# Patient Record
Sex: MALE | Race: WHITE | NOT HISPANIC OR LATINO
[De-identification: names, ages, dates, MRNs, and addresses within clinical notes are randomized per-mention and may not be internally consistent; named-entity substitution may affect disease eponyms.]

---

## 2017-02-10 ENCOUNTER — TRANSCRIPTION ENCOUNTER (OUTPATIENT)
Age: 77
End: 2017-02-10

## 2017-02-14 ENCOUNTER — APPOINTMENT (OUTPATIENT)
Dept: FAMILY MEDICINE | Facility: CLINIC | Age: 77
End: 2017-02-14

## 2017-02-14 VITALS
BODY MASS INDEX: 25.73 KG/M2 | WEIGHT: 190 LBS | DIASTOLIC BLOOD PRESSURE: 90 MMHG | HEIGHT: 72 IN | SYSTOLIC BLOOD PRESSURE: 120 MMHG

## 2017-02-14 DIAGNOSIS — Z87.09 PERSONAL HISTORY OF OTHER DISEASES OF THE RESPIRATORY SYSTEM: ICD-10-CM

## 2017-03-07 ENCOUNTER — APPOINTMENT (OUTPATIENT)
Dept: FAMILY MEDICINE | Facility: CLINIC | Age: 77
End: 2017-03-07

## 2017-03-07 VITALS
DIASTOLIC BLOOD PRESSURE: 80 MMHG | SYSTOLIC BLOOD PRESSURE: 120 MMHG | HEIGHT: 72 IN | WEIGHT: 195 LBS | BODY MASS INDEX: 26.41 KG/M2

## 2017-03-07 RX ORDER — AMOXICILLIN AND CLAVULANATE POTASSIUM 875; 125 MG/1; MG/1
875-125 TABLET, COATED ORAL
Qty: 14 | Refills: 0 | Status: COMPLETED | COMMUNITY
Start: 2017-02-14 | End: 2017-03-07

## 2017-03-08 LAB
ALBUMIN SERPL ELPH-MCNC: 4.3 G/DL
ALP BLD-CCNC: 54 U/L
ALT SERPL-CCNC: 14 U/L
ANION GAP SERPL CALC-SCNC: 12 MMOL/L
APPEARANCE: CLEAR
AST SERPL-CCNC: 20 U/L
BACTERIA: NEGATIVE
BASOPHILS # BLD AUTO: 0.03 K/UL
BASOPHILS NFR BLD AUTO: 0.4 %
BILIRUB SERPL-MCNC: 0.6 MG/DL
BILIRUBIN URINE: NEGATIVE
BLOOD URINE: NEGATIVE
BUN SERPL-MCNC: 22 MG/DL
CALCIUM SERPL-MCNC: 9.8 MG/DL
CHLORIDE SERPL-SCNC: 100 MMOL/L
CHOLEST SERPL-MCNC: 175 MG/DL
CHOLEST/HDLC SERPL: 2.7 RATIO
CO2 SERPL-SCNC: 27 MMOL/L
COLOR: YELLOW
CREAT SERPL-MCNC: 0.98 MG/DL
EOSINOPHIL # BLD AUTO: 0.34 K/UL
EOSINOPHIL NFR BLD AUTO: 4.7 %
GLUCOSE QUALITATIVE U: NORMAL MG/DL
GLUCOSE SERPL-MCNC: 97 MG/DL
HCT VFR BLD CALC: 42.9 %
HDLC SERPL-MCNC: 65 MG/DL
HGB BLD-MCNC: 14.5 G/DL
HYALINE CASTS: 0 /LPF
IMM GRANULOCYTES NFR BLD AUTO: 0.1 %
KETONES URINE: NEGATIVE
LDLC SERPL CALC-MCNC: 101 MG/DL
LEUKOCYTE ESTERASE URINE: NEGATIVE
LYMPHOCYTES # BLD AUTO: 1.68 K/UL
LYMPHOCYTES NFR BLD AUTO: 23.3 %
MAN DIFF?: NORMAL
MCHC RBC-ENTMCNC: 32.7 PG
MCHC RBC-ENTMCNC: 33.8 GM/DL
MCV RBC AUTO: 96.8 FL
MICROSCOPIC-UA: NORMAL
MONOCYTES # BLD AUTO: 0.75 K/UL
MONOCYTES NFR BLD AUTO: 10.4 %
NEUTROPHILS # BLD AUTO: 4.4 K/UL
NEUTROPHILS NFR BLD AUTO: 61.1 %
NITRITE URINE: NEGATIVE
PH URINE: 6.5
PLATELET # BLD AUTO: 206 K/UL
POTASSIUM SERPL-SCNC: 4.9 MMOL/L
PROT SERPL-MCNC: 7.2 G/DL
PROTEIN URINE: NEGATIVE MG/DL
PSA SERPL-MCNC: 5.32 NG/ML
RBC # BLD: 4.43 M/UL
RBC # FLD: 13.8 %
RED BLOOD CELLS URINE: 5 /HPF
SODIUM SERPL-SCNC: 139 MMOL/L
SPECIFIC GRAVITY URINE: 1.02
SQUAMOUS EPITHELIAL CELLS: 0 /HPF
TRIGL SERPL-MCNC: 45 MG/DL
TSH SERPL-ACNC: 4 UIU/ML
UROBILINOGEN URINE: NORMAL MG/DL
WBC # FLD AUTO: 7.21 K/UL
WHITE BLOOD CELLS URINE: 1 /HPF

## 2017-04-26 ENCOUNTER — TRANSCRIPTION ENCOUNTER (OUTPATIENT)
Age: 77
End: 2017-04-26

## 2017-11-03 ENCOUNTER — APPOINTMENT (OUTPATIENT)
Dept: FAMILY MEDICINE | Facility: CLINIC | Age: 77
End: 2017-11-03
Payer: MEDICARE

## 2017-11-03 VITALS
HEIGHT: 72 IN | DIASTOLIC BLOOD PRESSURE: 78 MMHG | HEART RATE: 60 BPM | SYSTOLIC BLOOD PRESSURE: 124 MMHG | WEIGHT: 195 LBS | BODY MASS INDEX: 26.41 KG/M2 | RESPIRATION RATE: 16 BRPM

## 2017-11-03 PROCEDURE — 99213 OFFICE O/P EST LOW 20 MIN: CPT | Mod: 25

## 2017-11-03 PROCEDURE — G0008: CPT

## 2017-11-03 PROCEDURE — 90688 IIV4 VACCINE SPLT 0.5 ML IM: CPT

## 2017-11-15 ENCOUNTER — RX RENEWAL (OUTPATIENT)
Age: 77
End: 2017-11-15

## 2017-11-20 ENCOUNTER — APPOINTMENT (OUTPATIENT)
Dept: FAMILY MEDICINE | Facility: CLINIC | Age: 77
End: 2017-11-20
Payer: MEDICARE

## 2017-11-20 PROCEDURE — 99214 OFFICE O/P EST MOD 30 MIN: CPT | Mod: 25

## 2017-11-20 PROCEDURE — 90670 PCV13 VACCINE IM: CPT

## 2017-11-20 PROCEDURE — G0009: CPT

## 2018-01-08 ENCOUNTER — APPOINTMENT (OUTPATIENT)
Dept: FAMILY MEDICINE | Facility: CLINIC | Age: 78
End: 2018-01-08
Payer: MEDICARE

## 2018-01-08 ENCOUNTER — LABORATORY RESULT (OUTPATIENT)
Age: 78
End: 2018-01-08

## 2018-01-08 VITALS
HEIGHT: 72 IN | SYSTOLIC BLOOD PRESSURE: 130 MMHG | DIASTOLIC BLOOD PRESSURE: 74 MMHG | BODY MASS INDEX: 22.35 KG/M2 | WEIGHT: 165 LBS

## 2018-01-08 PROCEDURE — 36415 COLL VENOUS BLD VENIPUNCTURE: CPT

## 2018-01-08 PROCEDURE — 99214 OFFICE O/P EST MOD 30 MIN: CPT | Mod: 25

## 2018-01-09 LAB
ALBUMIN SERPL ELPH-MCNC: 4.4 G/DL
ALP BLD-CCNC: 48 U/L
ALT SERPL-CCNC: 16 U/L
ANION GAP SERPL CALC-SCNC: 12 MMOL/L
AST SERPL-CCNC: 23 U/L
BASOPHILS # BLD AUTO: 0.06 K/UL
BASOPHILS NFR BLD AUTO: 0.9 %
BILIRUB SERPL-MCNC: 0.7 MG/DL
BUN SERPL-MCNC: 24 MG/DL
CALCIUM SERPL-MCNC: 9.7 MG/DL
CHLORIDE SERPL-SCNC: 101 MMOL/L
CHOLEST SERPL-MCNC: 179 MG/DL
CHOLEST/HDLC SERPL: 2.8 RATIO
CO2 SERPL-SCNC: 30 MMOL/L
CREAT SERPL-MCNC: 0.93 MG/DL
EOSINOPHIL # BLD AUTO: 0.38 K/UL
EOSINOPHIL NFR BLD AUTO: 6.1
GLUCOSE SERPL-MCNC: 93 MG/DL
HCT VFR BLD CALC: 45.1 %
HDLC SERPL-MCNC: 64 MG/DL
HGB BLD-MCNC: 13.6 G/DL
LDLC SERPL CALC-MCNC: 102 MG/DL
LYMPHOCYTES # BLD AUTO: 1.55 K/UL
LYMPHOCYTES NFR BLD AUTO: 24.6 %
MAN DIFF?: NORMAL
MCHC RBC-ENTMCNC: 30.2 GM/DL
MCHC RBC-ENTMCNC: 32.2 PG
MCV RBC AUTO: 106.6 FL
MONOCYTES # BLD AUTO: 0.66 K/UL
MONOCYTES NFR BLD AUTO: 10.5 %
NEUTROPHILS # BLD AUTO: 3.6 K/UL
NEUTROPHILS NFR BLD AUTO: 57 %
PLATELET # BLD AUTO: 183 K/UL
POTASSIUM SERPL-SCNC: 4.6 MMOL/L
PROT SERPL-MCNC: 7.3 G/DL
RBC # BLD: 4.23 M/UL
RBC # FLD: 14.5 %
SODIUM SERPL-SCNC: 143 MMOL/L
TRIGL SERPL-MCNC: 63 MG/DL
TSH SERPL-ACNC: 3.04 UIU/ML
WBC # FLD AUTO: 6.31 K/UL

## 2018-03-12 ENCOUNTER — APPOINTMENT (OUTPATIENT)
Dept: FAMILY MEDICINE | Facility: CLINIC | Age: 78
End: 2018-03-12
Payer: MEDICARE

## 2018-03-12 VITALS
RESPIRATION RATE: 16 BRPM | HEIGHT: 72 IN | TEMPERATURE: 97.6 F | OXYGEN SATURATION: 99 % | DIASTOLIC BLOOD PRESSURE: 72 MMHG | WEIGHT: 195 LBS | HEART RATE: 56 BPM | BODY MASS INDEX: 26.41 KG/M2 | SYSTOLIC BLOOD PRESSURE: 100 MMHG

## 2018-03-12 PROCEDURE — 36415 COLL VENOUS BLD VENIPUNCTURE: CPT

## 2018-03-12 PROCEDURE — G0438: CPT

## 2018-03-13 LAB
APPEARANCE: CLEAR
BACTERIA: NEGATIVE
BASOPHILS # BLD AUTO: 0.03 K/UL
BASOPHILS NFR BLD AUTO: 0.5 %
BILIRUBIN URINE: NEGATIVE
BLOOD URINE: NEGATIVE
COLOR: YELLOW
EOSINOPHIL # BLD AUTO: 0.37 K/UL
EOSINOPHIL NFR BLD AUTO: 5.6 %
GLUCOSE QUALITATIVE U: NEGATIVE MG/DL
HCT VFR BLD CALC: 44.8 %
HGB BLD-MCNC: 14.2 G/DL
IMM GRANULOCYTES NFR BLD AUTO: 0.2 %
KETONES URINE: NEGATIVE
LEUKOCYTE ESTERASE URINE: NEGATIVE
LYMPHOCYTES # BLD AUTO: 2.03 K/UL
LYMPHOCYTES NFR BLD AUTO: 30.5 %
MAN DIFF?: NORMAL
MCHC RBC-ENTMCNC: 31.7 GM/DL
MCHC RBC-ENTMCNC: 32.1 PG
MCV RBC AUTO: 101.1 FL
MICROSCOPIC-UA: NORMAL
MONOCYTES # BLD AUTO: 0.48 K/UL
MONOCYTES NFR BLD AUTO: 7.2 %
NEUTROPHILS # BLD AUTO: 3.73 K/UL
NEUTROPHILS NFR BLD AUTO: 56 %
NITRITE URINE: NEGATIVE
PH URINE: 7
PLATELET # BLD AUTO: 168 K/UL
PROTEIN URINE: NEGATIVE MG/DL
PSA SERPL-MCNC: 2.22 NG/ML
RBC # BLD: 4.43 M/UL
RBC # FLD: 14 %
RED BLOOD CELLS URINE: 1 /HPF
SPECIFIC GRAVITY URINE: 1.02
SQUAMOUS EPITHELIAL CELLS: 0 /HPF
UROBILINOGEN URINE: NEGATIVE MG/DL
WBC # FLD AUTO: 6.65 K/UL
WHITE BLOOD CELLS URINE: 0 /HPF

## 2018-06-12 ENCOUNTER — TRANSCRIPTION ENCOUNTER (OUTPATIENT)
Age: 78
End: 2018-06-12

## 2018-09-13 ENCOUNTER — RX RENEWAL (OUTPATIENT)
Age: 78
End: 2018-09-13

## 2018-09-18 ENCOUNTER — MEDICATION RENEWAL (OUTPATIENT)
Age: 78
End: 2018-09-18

## 2018-09-25 ENCOUNTER — MEDICATION RENEWAL (OUTPATIENT)
Age: 78
End: 2018-09-25

## 2018-09-25 ENCOUNTER — APPOINTMENT (OUTPATIENT)
Dept: FAMILY MEDICINE | Facility: CLINIC | Age: 78
End: 2018-09-25
Payer: MEDICARE

## 2018-09-25 VITALS
HEIGHT: 72 IN | WEIGHT: 190 LBS | OXYGEN SATURATION: 97 % | RESPIRATION RATE: 18 BRPM | HEART RATE: 56 BPM | BODY MASS INDEX: 25.73 KG/M2 | SYSTOLIC BLOOD PRESSURE: 140 MMHG | DIASTOLIC BLOOD PRESSURE: 84 MMHG

## 2018-09-25 PROCEDURE — 99214 OFFICE O/P EST MOD 30 MIN: CPT | Mod: 25

## 2018-09-25 PROCEDURE — 36415 COLL VENOUS BLD VENIPUNCTURE: CPT

## 2018-09-25 NOTE — HISTORY OF PRESENT ILLNESS
[FreeTextEntry8] : 78 year old male is here for a followup visit. Patient is here for medication renewals and for blood work discussion. Medications and allergies were reviewed and assessed.  There has been no new medications since the last visit. Patient is feeling well with no active changes or issues since His last visit.\par

## 2018-09-26 LAB
ALBUMIN SERPL ELPH-MCNC: 4.8 G/DL
ALP BLD-CCNC: 46 U/L
ALT SERPL-CCNC: 15 U/L
ANION GAP SERPL CALC-SCNC: 12 MMOL/L
AST SERPL-CCNC: 27 U/L
BASOPHILS # BLD AUTO: 0.03 K/UL
BASOPHILS NFR BLD AUTO: 0.5 %
BILIRUB SERPL-MCNC: 0.8 MG/DL
BUN SERPL-MCNC: 16 MG/DL
CALCIUM SERPL-MCNC: 9.7 MG/DL
CHLORIDE SERPL-SCNC: 99 MMOL/L
CHOLEST SERPL-MCNC: 169 MG/DL
CHOLEST/HDLC SERPL: 2.3 RATIO
CO2 SERPL-SCNC: 30 MMOL/L
CREAT SERPL-MCNC: 0.98 MG/DL
EOSINOPHIL # BLD AUTO: 0.5 K/UL
EOSINOPHIL NFR BLD AUTO: 8.3 %
GLUCOSE SERPL-MCNC: 95 MG/DL
HBA1C MFR BLD HPLC: 5.1 %
HCT VFR BLD CALC: 43.1 %
HDLC SERPL-MCNC: 72 MG/DL
HGB BLD-MCNC: 14.2 G/DL
IMM GRANULOCYTES NFR BLD AUTO: 0.2 %
LDLC SERPL CALC-MCNC: 86 MG/DL
LYMPHOCYTES # BLD AUTO: 1.76 K/UL
LYMPHOCYTES NFR BLD AUTO: 29.1 %
MAN DIFF?: NORMAL
MCHC RBC-ENTMCNC: 32.9 GM/DL
MCHC RBC-ENTMCNC: 33 PG
MCV RBC AUTO: 100.2 FL
MONOCYTES # BLD AUTO: 0.57 K/UL
MONOCYTES NFR BLD AUTO: 9.4 %
NEUTROPHILS # BLD AUTO: 3.18 K/UL
NEUTROPHILS NFR BLD AUTO: 52.5 %
PLATELET # BLD AUTO: 182 K/UL
POTASSIUM SERPL-SCNC: 4.6 MMOL/L
PROT SERPL-MCNC: 7.1 G/DL
RBC # BLD: 4.3 M/UL
RBC # FLD: 13.6 %
SODIUM SERPL-SCNC: 141 MMOL/L
TRIGL SERPL-MCNC: 57 MG/DL
WBC # FLD AUTO: 6.05 K/UL

## 2018-10-05 ENCOUNTER — APPOINTMENT (OUTPATIENT)
Dept: FAMILY MEDICINE | Facility: CLINIC | Age: 78
End: 2018-10-05
Payer: MEDICARE

## 2018-10-05 VITALS
DIASTOLIC BLOOD PRESSURE: 70 MMHG | SYSTOLIC BLOOD PRESSURE: 120 MMHG | BODY MASS INDEX: 25.73 KG/M2 | HEIGHT: 72 IN | WEIGHT: 190 LBS

## 2018-10-05 DIAGNOSIS — Z23 ENCOUNTER FOR IMMUNIZATION: ICD-10-CM

## 2018-10-05 PROCEDURE — G0008: CPT

## 2018-10-05 PROCEDURE — 99213 OFFICE O/P EST LOW 20 MIN: CPT | Mod: 25

## 2018-10-05 PROCEDURE — 90686 IIV4 VACC NO PRSV 0.5 ML IM: CPT

## 2018-10-05 NOTE — PHYSICAL EXAM
[Well Nourished] : well nourished [Clear to Auscultation] : lungs were clear to auscultation bilaterally [Regular Rhythm] : with a regular rhythm [Normal S1, S2] : normal S1 and S2 [Normal Gait] : normal gait

## 2018-10-05 NOTE — ASSESSMENT
[FreeTextEntry1] : thyroid - stable\par \par Patient was given a vaccine and was counseled regarding all side affects. Patient was advised to ice the area if necessary if it is tender or red. Patient was told to return to office if has any fever, nausea, vomiting or increased pain.\par \par

## 2018-10-05 NOTE — HISTORY OF PRESENT ILLNESS
[FreeTextEntry8] : 78 year old male here for flu shot. patient with history of hypothyroid. Patients active medications, allergies and issues were all reviewed with the patient at time of visit.\par  [de-identified] : acid reflux, shot

## 2018-10-08 ENCOUNTER — APPOINTMENT (OUTPATIENT)
Dept: ORTHOPEDIC SURGERY | Facility: CLINIC | Age: 78
End: 2018-10-08

## 2019-01-15 ENCOUNTER — APPOINTMENT (OUTPATIENT)
Dept: FAMILY MEDICINE | Facility: CLINIC | Age: 79
End: 2019-01-15
Payer: MEDICARE

## 2019-01-15 VITALS
DIASTOLIC BLOOD PRESSURE: 70 MMHG | OXYGEN SATURATION: 94 % | HEIGHT: 72 IN | HEART RATE: 66 BPM | RESPIRATION RATE: 16 BRPM | BODY MASS INDEX: 27.5 KG/M2 | WEIGHT: 203 LBS | SYSTOLIC BLOOD PRESSURE: 120 MMHG

## 2019-01-15 PROCEDURE — 99213 OFFICE O/P EST LOW 20 MIN: CPT

## 2019-01-16 ENCOUNTER — MEDICATION RENEWAL (OUTPATIENT)
Age: 79
End: 2019-01-16

## 2019-03-05 ENCOUNTER — APPOINTMENT (OUTPATIENT)
Dept: FAMILY MEDICINE | Facility: CLINIC | Age: 79
End: 2019-03-05
Payer: MEDICARE

## 2019-03-05 VITALS
WEIGHT: 203 LBS | SYSTOLIC BLOOD PRESSURE: 118 MMHG | HEIGHT: 72 IN | DIASTOLIC BLOOD PRESSURE: 80 MMHG | BODY MASS INDEX: 27.5 KG/M2

## 2019-03-05 PROCEDURE — 99213 OFFICE O/P EST LOW 20 MIN: CPT

## 2019-03-05 RX ORDER — CYCLOBENZAPRINE HYDROCHLORIDE 5 MG/1
5 TABLET, FILM COATED ORAL
Qty: 14 | Refills: 0 | Status: DISCONTINUED | COMMUNITY
Start: 2019-01-15 | End: 2019-03-05

## 2019-03-14 ENCOUNTER — APPOINTMENT (OUTPATIENT)
Dept: FAMILY MEDICINE | Facility: CLINIC | Age: 79
End: 2019-03-14
Payer: MEDICARE

## 2019-03-14 VITALS
SYSTOLIC BLOOD PRESSURE: 120 MMHG | BODY MASS INDEX: 27.09 KG/M2 | DIASTOLIC BLOOD PRESSURE: 80 MMHG | RESPIRATION RATE: 16 BRPM | WEIGHT: 200 LBS | OXYGEN SATURATION: 92 % | HEIGHT: 72 IN | HEART RATE: 66 BPM

## 2019-03-14 PROCEDURE — 36415 COLL VENOUS BLD VENIPUNCTURE: CPT

## 2019-03-14 PROCEDURE — 93000 ELECTROCARDIOGRAM COMPLETE: CPT

## 2019-03-14 PROCEDURE — G0438: CPT

## 2019-03-15 LAB
ALBUMIN SERPL ELPH-MCNC: 4.3 G/DL
ALP BLD-CCNC: 43 U/L
ALT SERPL-CCNC: 17 U/L
ANION GAP SERPL CALC-SCNC: 12 MMOL/L
APPEARANCE: CLEAR
AST SERPL-CCNC: 21 U/L
BACTERIA: NEGATIVE
BASOPHILS # BLD AUTO: 0.04 K/UL
BASOPHILS NFR BLD AUTO: 0.6 %
BILIRUB SERPL-MCNC: 0.6 MG/DL
BILIRUBIN URINE: NEGATIVE
BLOOD URINE: NEGATIVE
BUN SERPL-MCNC: 21 MG/DL
CALCIUM SERPL-MCNC: 9.7 MG/DL
CHLORIDE SERPL-SCNC: 102 MMOL/L
CHOLEST SERPL-MCNC: 161 MG/DL
CHOLEST/HDLC SERPL: 2.7 RATIO
CO2 SERPL-SCNC: 27 MMOL/L
COLOR: YELLOW
CREAT SERPL-MCNC: 1.02 MG/DL
EOSINOPHIL # BLD AUTO: 0.55 K/UL
EOSINOPHIL NFR BLD AUTO: 8.7 %
GLUCOSE QUALITATIVE U: NEGATIVE
GLUCOSE SERPL-MCNC: 99 MG/DL
HBA1C MFR BLD HPLC: 5.2 %
HCT VFR BLD CALC: 44.1 %
HDLC SERPL-MCNC: 59 MG/DL
HGB BLD-MCNC: 13.9 G/DL
HYALINE CASTS: 0 /LPF
IMM GRANULOCYTES NFR BLD AUTO: 0.2 %
KETONES URINE: NEGATIVE
LDLC SERPL CALC-MCNC: 88 MG/DL
LEUKOCYTE ESTERASE URINE: NEGATIVE
LYMPHOCYTES # BLD AUTO: 1.72 K/UL
LYMPHOCYTES NFR BLD AUTO: 27.1 %
MAN DIFF?: NORMAL
MCHC RBC-ENTMCNC: 31.5 GM/DL
MCHC RBC-ENTMCNC: 32.3 PG
MCV RBC AUTO: 102.3 FL
MICROSCOPIC-UA: NORMAL
MONOCYTES # BLD AUTO: 0.45 K/UL
MONOCYTES NFR BLD AUTO: 7.1 %
NEUTROPHILS # BLD AUTO: 3.58 K/UL
NEUTROPHILS NFR BLD AUTO: 56.3 %
NITRITE URINE: NEGATIVE
PH URINE: 7
PLATELET # BLD AUTO: 165 K/UL
POTASSIUM SERPL-SCNC: 4.4 MMOL/L
PROT SERPL-MCNC: 6.7 G/DL
PROTEIN URINE: NEGATIVE
PSA SERPL-MCNC: 2.48 NG/ML
RBC # BLD: 4.31 M/UL
RBC # FLD: 13 %
RED BLOOD CELLS URINE: 1 /HPF
SODIUM SERPL-SCNC: 141 MMOL/L
SPECIFIC GRAVITY URINE: 1.02
SQUAMOUS EPITHELIAL CELLS: 0 /HPF
TRIGL SERPL-MCNC: 70 MG/DL
TSH SERPL-ACNC: 3.86 UIU/ML
UROBILINOGEN URINE: NORMAL
WBC # FLD AUTO: 6.35 K/UL
WHITE BLOOD CELLS URINE: 0 /HPF

## 2019-03-16 LAB — 25(OH)D3 SERPL-MCNC: 46.9 NG/ML

## 2019-03-21 ENCOUNTER — TRANSCRIPTION ENCOUNTER (OUTPATIENT)
Age: 79
End: 2019-03-21

## 2019-04-02 ENCOUNTER — MEDICATION RENEWAL (OUTPATIENT)
Age: 79
End: 2019-04-02

## 2019-06-06 ENCOUNTER — RX RENEWAL (OUTPATIENT)
Age: 79
End: 2019-06-06

## 2019-07-21 ENCOUNTER — APPOINTMENT (OUTPATIENT)
Dept: FAMILY MEDICINE | Facility: CLINIC | Age: 79
End: 2019-07-21
Payer: COMMERCIAL

## 2019-07-21 VITALS
SYSTOLIC BLOOD PRESSURE: 140 MMHG | HEIGHT: 72 IN | OXYGEN SATURATION: 94 % | WEIGHT: 200 LBS | BODY MASS INDEX: 27.09 KG/M2 | RESPIRATION RATE: 16 BRPM | DIASTOLIC BLOOD PRESSURE: 80 MMHG | HEART RATE: 67 BPM

## 2019-07-21 PROCEDURE — 99213 OFFICE O/P EST LOW 20 MIN: CPT

## 2019-07-21 RX ORDER — KETOCONAZOLE 20 MG/G
2 CREAM TOPICAL
Qty: 60 | Refills: 0 | Status: DISCONTINUED | COMMUNITY
Start: 2019-04-25

## 2019-09-25 ENCOUNTER — RX RENEWAL (OUTPATIENT)
Age: 79
End: 2019-09-25

## 2019-09-26 ENCOUNTER — RX RENEWAL (OUTPATIENT)
Age: 79
End: 2019-09-26

## 2019-10-04 ENCOUNTER — TRANSCRIPTION ENCOUNTER (OUTPATIENT)
Age: 79
End: 2019-10-04

## 2019-10-20 ENCOUNTER — RX RENEWAL (OUTPATIENT)
Age: 79
End: 2019-10-20

## 2019-11-05 ENCOUNTER — APPOINTMENT (OUTPATIENT)
Dept: FAMILY MEDICINE | Facility: CLINIC | Age: 79
End: 2019-11-05
Payer: MEDICARE

## 2019-11-05 VITALS
BODY MASS INDEX: 27.09 KG/M2 | DIASTOLIC BLOOD PRESSURE: 80 MMHG | WEIGHT: 200 LBS | SYSTOLIC BLOOD PRESSURE: 110 MMHG | HEIGHT: 72 IN

## 2019-11-05 PROCEDURE — G0008: CPT

## 2019-11-05 PROCEDURE — 90686 IIV4 VACC NO PRSV 0.5 ML IM: CPT

## 2019-11-05 PROCEDURE — 36415 COLL VENOUS BLD VENIPUNCTURE: CPT

## 2019-11-05 PROCEDURE — 99214 OFFICE O/P EST MOD 30 MIN: CPT | Mod: 25

## 2019-11-06 LAB
ALBUMIN SERPL ELPH-MCNC: 4.7 G/DL
ALP BLD-CCNC: 48 U/L
ALT SERPL-CCNC: 15 U/L
ANION GAP SERPL CALC-SCNC: 14 MMOL/L
AST SERPL-CCNC: 24 U/L
BASOPHILS # BLD AUTO: 0.05 K/UL
BASOPHILS NFR BLD AUTO: 0.6 %
BILIRUB SERPL-MCNC: 0.5 MG/DL
BUN SERPL-MCNC: 20 MG/DL
CALCIUM SERPL-MCNC: 10 MG/DL
CHLORIDE SERPL-SCNC: 95 MMOL/L
CHOLEST SERPL-MCNC: 178 MG/DL
CHOLEST/HDLC SERPL: 2.7 RATIO
CO2 SERPL-SCNC: 27 MMOL/L
CREAT SERPL-MCNC: 1.03 MG/DL
EOSINOPHIL # BLD AUTO: 0.55 K/UL
EOSINOPHIL NFR BLD AUTO: 7.1 %
GLUCOSE SERPL-MCNC: 88 MG/DL
HCT VFR BLD CALC: 43.1 %
HDLC SERPL-MCNC: 67 MG/DL
HGB BLD-MCNC: 14.5 G/DL
IMM GRANULOCYTES NFR BLD AUTO: 0.3 %
LDLC SERPL CALC-MCNC: 89 MG/DL
LYMPHOCYTES # BLD AUTO: 2.5 K/UL
LYMPHOCYTES NFR BLD AUTO: 32.4 %
MAN DIFF?: NORMAL
MCHC RBC-ENTMCNC: 33.5 PG
MCHC RBC-ENTMCNC: 33.6 GM/DL
MCV RBC AUTO: 99.5 FL
MONOCYTES # BLD AUTO: 0.66 K/UL
MONOCYTES NFR BLD AUTO: 8.5 %
NEUTROPHILS # BLD AUTO: 3.94 K/UL
NEUTROPHILS NFR BLD AUTO: 51.1 %
PLATELET # BLD AUTO: 185 K/UL
POTASSIUM SERPL-SCNC: 3.6 MMOL/L
PROT SERPL-MCNC: 7.3 G/DL
RBC # BLD: 4.33 M/UL
RBC # FLD: 12.9 %
SODIUM SERPL-SCNC: 136 MMOL/L
TRIGL SERPL-MCNC: 108 MG/DL
TSH SERPL-ACNC: 6.55 UIU/ML
WBC # FLD AUTO: 7.72 K/UL

## 2020-04-27 ENCOUNTER — RX RENEWAL (OUTPATIENT)
Age: 80
End: 2020-04-27

## 2020-05-13 ENCOUNTER — RX RENEWAL (OUTPATIENT)
Age: 80
End: 2020-05-13

## 2020-06-07 ENCOUNTER — RX RENEWAL (OUTPATIENT)
Age: 80
End: 2020-06-07

## 2020-06-11 ENCOUNTER — APPOINTMENT (OUTPATIENT)
Dept: FAMILY MEDICINE | Facility: CLINIC | Age: 80
End: 2020-06-11
Payer: MEDICARE

## 2020-06-11 VITALS
HEIGHT: 72 IN | BODY MASS INDEX: 26.82 KG/M2 | WEIGHT: 198 LBS | HEART RATE: 87 BPM | TEMPERATURE: 98.2 F | DIASTOLIC BLOOD PRESSURE: 80 MMHG | SYSTOLIC BLOOD PRESSURE: 130 MMHG | OXYGEN SATURATION: 99 %

## 2020-06-11 DIAGNOSIS — K21.9 GASTRO-ESOPHAGEAL REFLUX DISEASE W/OUT ESOPHAGITIS: ICD-10-CM

## 2020-06-11 PROCEDURE — G0439: CPT | Mod: 25

## 2020-06-11 PROCEDURE — 36415 COLL VENOUS BLD VENIPUNCTURE: CPT

## 2020-06-11 RX ORDER — FUROSEMIDE 20 MG/1
20 TABLET ORAL DAILY
Qty: 30 | Refills: 0 | Status: DISCONTINUED | COMMUNITY
Start: 2019-07-21 | End: 2020-06-11

## 2020-10-26 ENCOUNTER — APPOINTMENT (OUTPATIENT)
Dept: FAMILY MEDICINE | Facility: CLINIC | Age: 80
End: 2020-10-26

## 2020-12-10 ENCOUNTER — APPOINTMENT (OUTPATIENT)
Dept: FAMILY MEDICINE | Facility: CLINIC | Age: 80
End: 2020-12-10
Payer: MEDICARE

## 2020-12-10 VITALS
HEART RATE: 87 BPM | WEIGHT: 207 LBS | SYSTOLIC BLOOD PRESSURE: 122 MMHG | OXYGEN SATURATION: 95 % | TEMPERATURE: 97.9 F | BODY MASS INDEX: 28.07 KG/M2 | DIASTOLIC BLOOD PRESSURE: 80 MMHG

## 2020-12-10 DIAGNOSIS — H04.129 DRY EYE SYNDROME OF UNSPECIFIED LACRIMAL GLAND: ICD-10-CM

## 2020-12-10 DIAGNOSIS — Z11.59 ENCOUNTER FOR SCREENING FOR OTHER VIRAL DISEASES: ICD-10-CM

## 2020-12-10 PROCEDURE — 99072 ADDL SUPL MATRL&STAF TM PHE: CPT

## 2020-12-10 PROCEDURE — 36415 COLL VENOUS BLD VENIPUNCTURE: CPT

## 2020-12-10 PROCEDURE — 99214 OFFICE O/P EST MOD 30 MIN: CPT | Mod: 25

## 2020-12-10 RX ORDER — OMEPRAZOLE 40 MG/1
40 CAPSULE, DELAYED RELEASE ORAL
Qty: 30 | Refills: 1 | Status: DISCONTINUED | COMMUNITY
Start: 2017-11-20 | End: 2020-12-10

## 2020-12-10 NOTE — REVIEW OF SYSTEMS
[Negative] : Genitourinary [Headache] : no headache [Dizziness] : no dizziness [FreeTextEntry3] : right eye dryness [FreeTextEntry8] : ED

## 2020-12-10 NOTE — PHYSICAL EXAM
[No Edema] : there was no peripheral edema [No Extremity Clubbing/Cyanosis] : no extremity clubbing/cyanosis [Normal] : affect was normal and insight and judgment were intact [Normal Oropharynx] : the oropharynx was normal [de-identified] : possible small start of stye right lower inner lid

## 2020-12-10 NOTE — PLAN
[FreeTextEntry1] : Will follow up labwork drawn in office today.\par \par Would like COVID ab testing. \par Discussed COVID antibody test.  Discussed precautions.  Advised antibody presence indicates prior exposure/infection.  Not used to diagnose current infection.\par \par Will get records from Dr. Daigle's office, evaluate possibility for ED medications. \par \par Dry eye-recommending trial of artificial tears; can apply warm compresses to eyelid 4x a day if stye develops. \par Patient expressed understanding of plan.

## 2020-12-15 PROBLEM — Z87.09 HISTORY OF ACUTE SINUSITIS: Status: RESOLVED | Noted: 2017-02-14 | Resolved: 2020-12-15

## 2020-12-18 ENCOUNTER — RX RENEWAL (OUTPATIENT)
Age: 80
End: 2020-12-18

## 2021-02-04 ENCOUNTER — APPOINTMENT (OUTPATIENT)
Dept: FAMILY MEDICINE | Facility: CLINIC | Age: 81
End: 2021-02-04
Payer: MEDICARE

## 2021-02-04 VITALS — SYSTOLIC BLOOD PRESSURE: 134 MMHG | DIASTOLIC BLOOD PRESSURE: 84 MMHG

## 2021-02-04 VITALS
TEMPERATURE: 96.7 F | SYSTOLIC BLOOD PRESSURE: 140 MMHG | WEIGHT: 208 LBS | DIASTOLIC BLOOD PRESSURE: 86 MMHG | HEART RATE: 69 BPM | OXYGEN SATURATION: 95 % | BODY MASS INDEX: 28.21 KG/M2

## 2021-02-04 PROCEDURE — 99213 OFFICE O/P EST LOW 20 MIN: CPT

## 2021-02-04 NOTE — HISTORY OF PRESENT ILLNESS
[FreeTextEntry1] : Here for follow-up, elevated BP reading.  [de-identified] : Here for follow-up, elevated BP reading. \par \par Ate a lot of salty foods last night. Had peperonni pizza; had cold cut sandwich. \par Did not take his BP medication yet this morning-but reports his BP is always in range.\par \par Otherwise has been feeling ok.\par Medications and allergies reviewed.\par

## 2021-02-04 NOTE — REVIEW OF SYSTEMS
[Negative] : Gastrointestinal [Fever] : no fever [Chills] : no chills [Headache] : no headache [Dizziness] : no dizziness

## 2021-02-04 NOTE — PLAN
[FreeTextEntry1] : Repeat BP improved. Continue to monitor.  Continue current medications.\par \par Discussed blood pressure monitoring and normal ranges for BP.  Keep BP log.  Advised elevated readings or symptoms require immediate evaluation. \par Advised patient if they have a home blood pressure cuff, they may bring it with them to their next visit and compare machine readings.  \par Avoid added salt in diet.\par \par Patient will return for annual wellness visit. \par Patient expressed understanding.\par

## 2021-02-17 ENCOUNTER — APPOINTMENT (OUTPATIENT)
Dept: FAMILY MEDICINE | Facility: CLINIC | Age: 81
End: 2021-02-17

## 2021-03-02 ENCOUNTER — APPOINTMENT (OUTPATIENT)
Dept: FAMILY MEDICINE | Facility: CLINIC | Age: 81
End: 2021-03-02
Payer: MEDICARE

## 2021-03-02 VITALS
HEART RATE: 66 BPM | DIASTOLIC BLOOD PRESSURE: 98 MMHG | SYSTOLIC BLOOD PRESSURE: 146 MMHG | WEIGHT: 206 LBS | HEIGHT: 72 IN | OXYGEN SATURATION: 97 % | TEMPERATURE: 97.4 F | BODY MASS INDEX: 27.9 KG/M2

## 2021-03-02 VITALS — DIASTOLIC BLOOD PRESSURE: 90 MMHG | SYSTOLIC BLOOD PRESSURE: 154 MMHG

## 2021-03-02 DIAGNOSIS — M25.512 PAIN IN LEFT SHOULDER: ICD-10-CM

## 2021-03-02 PROCEDURE — 36415 COLL VENOUS BLD VENIPUNCTURE: CPT

## 2021-03-02 PROCEDURE — 99214 OFFICE O/P EST MOD 30 MIN: CPT | Mod: 25

## 2021-03-02 NOTE — REVIEW OF SYSTEMS
[Negative] : Genitourinary [Joint Pain] : joint pain [Headache] : no headache [Dizziness] : no dizziness

## 2021-03-02 NOTE — PLAN
[FreeTextEntry1] : Still with elevated BP. \par Recheck Creatinine. \par \par Advised to switch regimen, Quinapril twice a day and HCTZ once a day. \par Advised to notify us if any changes, continue to monitor BP at home. \par Any questions about medications advised to call. \par \par Shoulder pain-most likely rotator cuff strain.  With limitation in movement, recommending Ortho eval, referral placed. \par Advised to try trial of heat/warm compresses for muscle tenderness as needed.  Advised to cover compress, not place directly on skin and not to apply for more than 15 minutes at a time.  Patient expressed understanding.\par \par Will adjust meds based on labs. \par Patient expressed understanding of plan.\par

## 2021-03-02 NOTE — HISTORY OF PRESENT ILLNESS
[FreeTextEntry1] : Here for BP check, left shoulder pain.  [de-identified] : Here for BP check, left shoulder pain. \par Left shoulder has been bothering him for 6 weeks.\par Picked something up and remembers it bothering him afterwards. \par \par Has not tried anything for the pain. \par Worst with lifting arm.  Feels restricted. \par 6/10 with movement. No pain at rest. \par \par No old injuries. Right handed. \par \par Patient started doubling Quinapril-HCTZ on his own at beginning of February.  Still gets some high readings; does not have cuff with him today.  Brings log.  Running 130s-140s. \par Medications and allergies reviewed.\par

## 2021-03-02 NOTE — PHYSICAL EXAM
[Normal Oropharynx] : the oropharynx was normal [No Edema] : there was no peripheral edema [No Extremity Clubbing/Cyanosis] : no extremity clubbing/cyanosis [Normal] : affect was normal and insight and judgment were intact [Grossly Normal Strength/Tone] : grossly normal strength/tone [de-identified] : Left shoulder restricted in abduction; restricted in Apley's scratch.  Tender to palpation over left biceps.

## 2021-03-03 LAB
ANION GAP SERPL CALC-SCNC: 9 MMOL/L
BUN SERPL-MCNC: 21 MG/DL
CALCIUM SERPL-MCNC: 9.7 MG/DL
CHLORIDE SERPL-SCNC: 99 MMOL/L
CO2 SERPL-SCNC: 29 MMOL/L
CREAT SERPL-MCNC: 1 MG/DL
GLUCOSE SERPL-MCNC: 86 MG/DL
POTASSIUM SERPL-SCNC: 3.7 MMOL/L
SODIUM SERPL-SCNC: 137 MMOL/L

## 2021-03-10 ENCOUNTER — APPOINTMENT (OUTPATIENT)
Dept: ORTHOPEDIC SURGERY | Facility: CLINIC | Age: 81
End: 2021-03-10
Payer: MEDICARE

## 2021-03-10 VITALS
BODY MASS INDEX: 27.5 KG/M2 | HEIGHT: 72 IN | WEIGHT: 203 LBS | SYSTOLIC BLOOD PRESSURE: 158 MMHG | DIASTOLIC BLOOD PRESSURE: 87 MMHG | OXYGEN SATURATION: 98 % | HEART RATE: 62 BPM

## 2021-03-10 DIAGNOSIS — M19.019 PRIMARY OSTEOARTHRITIS, UNSPECIFIED SHOULDER: ICD-10-CM

## 2021-03-10 PROCEDURE — 99204 OFFICE O/P NEW MOD 45 MIN: CPT

## 2021-03-10 PROCEDURE — 73030 X-RAY EXAM OF SHOULDER: CPT | Mod: LT

## 2021-03-10 NOTE — HISTORY OF PRESENT ILLNESS
[Pain Location] : pain [] : left shoulder [Worsening] : worsening [___ wks] : [unfilled] week(s) ago [4] : a maximum pain level of 4/10 [Daily] : ~He/She~ states the symptoms seem to be occuring daily [Bending] : worsened by bending [Direct Pressure] : worsened by direct pressure [Lifting] : worsened by lifting [Rest] : relieved by rest [de-identified] : SHELIA   is a 80 year right hand dominant M who presents with ;eft shoulder pain.  Pain is primarily located at the superior and anterior shoulder. It began 6-8 weeks ago after doing some overhead lifting of boxes. .  Pain is described as sharp/achy in nature, 4/10 in intensity, worse with overhead activity, better with rest.  \par Denies bowel/bladder changes, fevers, chills, saddle anesthesia.  Denies numbness, tingling, weakness of the u[[er extremities.    \par \par

## 2021-03-10 NOTE — PHYSICAL EXAM
[de-identified] : Constitutional: Well-nourished, well-developed, No acute distress\par Respiratory:  Good respiratory effort, no SOB\par Lymphatic: No regional lymphadenopathy, no lymphedema\par Psychiatric: Pleasant and normal affect, alert and oriented x3\par Skin: Clean dry and intact B/L UE/LE\par Musculoskeletal: normal except where as noted in regional exam\par \par \par Left Shoulder:\par APPEARANCE: no marked deformities, no swelling or malalignment\par POSITIVE TENDERNESS: AC joint, biceps tendon\par ROM: Pain with  Flexion and abduction 0-100,  no scapular winging or dyskinesia present\par RESISTIVE TESTING: painless 5/5 resisted flex/ext, empty can/ER/IR, horizontal abd/add \par SPECIAL TESTS: neg Drop Arm, neg Empty Can, neg Soares/Neers, neg Anguiano's, neg Speeds, neg Apprehension, p+ cross arm adduction, + apley's scratch test, + AC compression\par Vasc: 2+ radial pulse\par Neuro: AIN, PIN, Ulnar nerve intact to motor, DTRs 2+/4 biceps, triceps, brachioradialis\par Sensation: Intact to light touch throughout\par B/L Elbows:  No asymmetry, malalignment, or swelling, Full ROM, 5/5 strength in flexion/ext, pronation/supination, Joints stable\par B/L Wrist and Hand:  No asymmetry, malalignment, or swelling, Full ROM, 5/5 strength in wrist and long finger flexion/ext, radial/ulnar deviation, Joints stable\par \par \par

## 2021-06-03 ENCOUNTER — RX RENEWAL (OUTPATIENT)
Age: 81
End: 2021-06-03

## 2021-06-14 ENCOUNTER — APPOINTMENT (OUTPATIENT)
Dept: FAMILY MEDICINE | Facility: CLINIC | Age: 81
End: 2021-06-14
Payer: MEDICARE

## 2021-06-14 VITALS
HEIGHT: 72 IN | DIASTOLIC BLOOD PRESSURE: 82 MMHG | HEART RATE: 67 BPM | TEMPERATURE: 98 F | BODY MASS INDEX: 27.77 KG/M2 | SYSTOLIC BLOOD PRESSURE: 118 MMHG | OXYGEN SATURATION: 96 % | WEIGHT: 205 LBS

## 2021-06-14 LAB
25(OH)D3 SERPL-MCNC: 48.2 NG/ML
ALBUMIN SERPL ELPH-MCNC: 4.5 G/DL
ALP BLD-CCNC: 58 U/L
ALT SERPL-CCNC: 15 U/L
ANION GAP SERPL CALC-SCNC: 10 MMOL/L
AST SERPL-CCNC: 20 U/L
BASOPHILS # BLD AUTO: 0.05 K/UL
BASOPHILS NFR BLD AUTO: 0.7 %
BILIRUB SERPL-MCNC: 0.8 MG/DL
BUN SERPL-MCNC: 15 MG/DL
CALCIUM SERPL-MCNC: 10.2 MG/DL
CHLORIDE SERPL-SCNC: 100 MMOL/L
CHOLEST SERPL-MCNC: 165 MG/DL
CO2 SERPL-SCNC: 31 MMOL/L
CREAT SERPL-MCNC: 1.04 MG/DL
EOSINOPHIL # BLD AUTO: 0.36 K/UL
EOSINOPHIL NFR BLD AUTO: 5.3 %
ESTIMATED AVERAGE GLUCOSE: 100 MG/DL
GLUCOSE SERPL-MCNC: 90 MG/DL
HBA1C MFR BLD HPLC: 5.1 %
HCT VFR BLD CALC: 44.3 %
HDLC SERPL-MCNC: 63 MG/DL
HGB BLD-MCNC: 14.7 G/DL
IMM GRANULOCYTES NFR BLD AUTO: 0.1 %
LDLC SERPL CALC-MCNC: 91 MG/DL
LYMPHOCYTES # BLD AUTO: 1.59 K/UL
LYMPHOCYTES NFR BLD AUTO: 23.5 %
MAN DIFF?: NORMAL
MCHC RBC-ENTMCNC: 32.9 PG
MCHC RBC-ENTMCNC: 33.2 GM/DL
MCV RBC AUTO: 99.1 FL
MONOCYTES # BLD AUTO: 0.66 K/UL
MONOCYTES NFR BLD AUTO: 9.7 %
NEUTROPHILS # BLD AUTO: 4.11 K/UL
NEUTROPHILS NFR BLD AUTO: 60.7 %
NONHDLC SERPL-MCNC: 102 MG/DL
PLATELET # BLD AUTO: 180 K/UL
POTASSIUM SERPL-SCNC: 4.4 MMOL/L
PROT SERPL-MCNC: 6.7 G/DL
PSA SERPL-MCNC: 2.34 NG/ML
RBC # BLD: 4.47 M/UL
RBC # FLD: 13 %
SODIUM SERPL-SCNC: 140 MMOL/L
TRIGL SERPL-MCNC: 58 MG/DL
TSH SERPL-ACNC: 3.16 UIU/ML
WBC # FLD AUTO: 6.78 K/UL

## 2021-06-14 PROCEDURE — 36415 COLL VENOUS BLD VENIPUNCTURE: CPT

## 2021-06-14 PROCEDURE — G0439: CPT

## 2021-06-28 ENCOUNTER — RX RENEWAL (OUTPATIENT)
Age: 81
End: 2021-06-28

## 2021-07-08 ENCOUNTER — RX RENEWAL (OUTPATIENT)
Age: 81
End: 2021-07-08

## 2021-10-03 ENCOUNTER — RX RENEWAL (OUTPATIENT)
Age: 81
End: 2021-10-03

## 2021-10-10 ENCOUNTER — RX RENEWAL (OUTPATIENT)
Age: 81
End: 2021-10-10

## 2021-12-13 ENCOUNTER — RX RENEWAL (OUTPATIENT)
Age: 81
End: 2021-12-13

## 2021-12-14 ENCOUNTER — APPOINTMENT (OUTPATIENT)
Dept: FAMILY MEDICINE | Facility: CLINIC | Age: 81
End: 2021-12-14
Payer: MEDICARE

## 2021-12-14 VITALS
HEIGHT: 72 IN | BODY MASS INDEX: 25.6 KG/M2 | DIASTOLIC BLOOD PRESSURE: 90 MMHG | HEART RATE: 70 BPM | WEIGHT: 189 LBS | SYSTOLIC BLOOD PRESSURE: 140 MMHG | TEMPERATURE: 98.2 F | OXYGEN SATURATION: 98 %

## 2021-12-14 DIAGNOSIS — M79.2 NEURALGIA AND NEURITIS, UNSPECIFIED: ICD-10-CM

## 2021-12-14 PROCEDURE — 99214 OFFICE O/P EST MOD 30 MIN: CPT | Mod: 25

## 2021-12-14 PROCEDURE — 36415 COLL VENOUS BLD VENIPUNCTURE: CPT

## 2021-12-14 RX ORDER — MELOXICAM 15 MG/1
15 TABLET ORAL DAILY
Qty: 14 | Refills: 0 | Status: DISCONTINUED | COMMUNITY
Start: 2021-03-10 | End: 2021-12-14

## 2021-12-14 NOTE — PLAN
[FreeTextEntry1] : Will follow up labwork drawn in office today.\par \par Possible Neuropathy-Neurology follow-up; normal strength.  Check B12. \par \par Discussed continuing to monitor BP; normal ranges for BP. \par Advised thyroid medication is to be taken first thing in the AM on empty stomach-45 min prior to breakfast. \par \par Will adjust meds based on labs. \par Patient expressed understanding of plan.\par

## 2021-12-14 NOTE — HISTORY OF PRESENT ILLNESS
[FreeTextEntry1] : Here for follow-up; needs med refills.\par  [de-identified] : Here for follow-up; needs med refills.\par Has been checking BP at home. Low to mid 130s/ DBP 70s at home. \par Quinapril 1 time per day for at least 1 month. \par Lost 15 lbs on Keto Diet.\par \par Flu shot-already got it at the pharmacy. \par COVID booster-few months ago \par Saw Neurologist few years ago for decreased sensation in feet. Taking OTC Vitamin B12 \par No pain, but notes he feels it is becoming more prominent.  No trips or falls.\par \par Medications and allergies reviewed.\par

## 2021-12-14 NOTE — PHYSICAL EXAM
[Normal Oropharynx] : the oropharynx was normal [No Edema] : there was no peripheral edema [No Extremity Clubbing/Cyanosis] : no extremity clubbing/cyanosis [Grossly Normal Strength/Tone] : grossly normal strength/tone [Normal] : affect was normal and insight and judgment were intact [No Lymphadenopathy] : no lymphadenopathy [de-identified] : Dorsal and Plantar flexion intact

## 2021-12-15 LAB
ALBUMIN SERPL ELPH-MCNC: 4.8 G/DL
ALP BLD-CCNC: 49 U/L
ALT SERPL-CCNC: 18 U/L
ANION GAP SERPL CALC-SCNC: 13 MMOL/L
AST SERPL-CCNC: 25 U/L
BASOPHILS # BLD AUTO: 0.04 K/UL
BASOPHILS NFR BLD AUTO: 0.6 %
BILIRUB SERPL-MCNC: 0.8 MG/DL
BUN SERPL-MCNC: 20 MG/DL
CALCIUM SERPL-MCNC: 10.6 MG/DL
CHLORIDE SERPL-SCNC: 99 MMOL/L
CHOLEST SERPL-MCNC: 186 MG/DL
CO2 SERPL-SCNC: 28 MMOL/L
CREAT SERPL-MCNC: 1.02 MG/DL
EOSINOPHIL # BLD AUTO: 0.29 K/UL
EOSINOPHIL NFR BLD AUTO: 4.2 %
ESTIMATED AVERAGE GLUCOSE: 97 MG/DL
FOLATE SERPL-MCNC: >20 NG/ML
GLUCOSE SERPL-MCNC: 82 MG/DL
HBA1C MFR BLD HPLC: 5 %
HCT VFR BLD CALC: 40.2 %
HDLC SERPL-MCNC: 76 MG/DL
HGB BLD-MCNC: 13.4 G/DL
IMM GRANULOCYTES NFR BLD AUTO: 0.1 %
LDLC SERPL CALC-MCNC: 101 MG/DL
LYMPHOCYTES # BLD AUTO: 2.17 K/UL
LYMPHOCYTES NFR BLD AUTO: 31.7 %
MAN DIFF?: NORMAL
MCHC RBC-ENTMCNC: 33.2 PG
MCHC RBC-ENTMCNC: 33.3 GM/DL
MCV RBC AUTO: 99.5 FL
MONOCYTES # BLD AUTO: 0.73 K/UL
MONOCYTES NFR BLD AUTO: 10.7 %
NEUTROPHILS # BLD AUTO: 3.6 K/UL
NEUTROPHILS NFR BLD AUTO: 52.7 %
NONHDLC SERPL-MCNC: 109 MG/DL
PLATELET # BLD AUTO: 184 K/UL
POTASSIUM SERPL-SCNC: 4.2 MMOL/L
PROT SERPL-MCNC: 7 G/DL
RBC # BLD: 4.04 M/UL
RBC # FLD: 14.5 %
SODIUM SERPL-SCNC: 140 MMOL/L
TRIGL SERPL-MCNC: 43 MG/DL
TSH SERPL-ACNC: 3.51 UIU/ML
VIT B12 SERPL-MCNC: 600 PG/ML
WBC # FLD AUTO: 6.84 K/UL

## 2021-12-30 LAB
ALBUMIN SERPL ELPH-MCNC: 5 G/DL
ALP BLD-CCNC: 48 U/L
ALT SERPL-CCNC: 21 U/L
ANION GAP SERPL CALC-SCNC: 15 MMOL/L
AST SERPL-CCNC: 27 U/L
BILIRUB SERPL-MCNC: 0.7 MG/DL
BUN SERPL-MCNC: 17 MG/DL
CALCIUM SERPL-MCNC: 9.8 MG/DL
CHLORIDE SERPL-SCNC: 98 MMOL/L
CO2 SERPL-SCNC: 27 MMOL/L
CREAT SERPL-MCNC: 0.97 MG/DL
GLUCOSE SERPL-MCNC: 95 MG/DL
POTASSIUM SERPL-SCNC: 4.4 MMOL/L
PROT SERPL-MCNC: 7.4 G/DL
SODIUM SERPL-SCNC: 140 MMOL/L

## 2022-02-11 ENCOUNTER — TRANSCRIPTION ENCOUNTER (OUTPATIENT)
Age: 82
End: 2022-02-11

## 2022-02-18 ENCOUNTER — TRANSCRIPTION ENCOUNTER (OUTPATIENT)
Age: 82
End: 2022-02-18

## 2022-03-31 ENCOUNTER — RX RENEWAL (OUTPATIENT)
Age: 82
End: 2022-03-31

## 2022-04-11 PROBLEM — Z11.59 SCREENING FOR VIRAL DISEASE: Status: RESOLVED | Noted: 2020-06-11 | Resolved: 2020-12-10

## 2022-04-14 ENCOUNTER — APPOINTMENT (OUTPATIENT)
Dept: NEUROLOGY | Facility: CLINIC | Age: 82
End: 2022-04-14
Payer: MEDICARE

## 2022-04-14 ENCOUNTER — LABORATORY RESULT (OUTPATIENT)
Age: 82
End: 2022-04-14

## 2022-04-14 VITALS
WEIGHT: 187 LBS | BODY MASS INDEX: 25.33 KG/M2 | HEART RATE: 57 BPM | DIASTOLIC BLOOD PRESSURE: 70 MMHG | HEIGHT: 72 IN | SYSTOLIC BLOOD PRESSURE: 142 MMHG

## 2022-04-14 DIAGNOSIS — M21.372 FOOT DROP, LEFT FOOT: ICD-10-CM

## 2022-04-14 DIAGNOSIS — Z82.49 FAMILY HISTORY OF ISCHEMIC HEART DISEASE AND OTHER DISEASES OF THE CIRCULATORY SYSTEM: ICD-10-CM

## 2022-04-14 DIAGNOSIS — R20.0 ANESTHESIA OF SKIN: ICD-10-CM

## 2022-04-14 PROCEDURE — 99205 OFFICE O/P NEW HI 60 MIN: CPT

## 2022-04-14 RX ORDER — ELECTROLYTES/DEXTROSE
100 SOLUTION, ORAL ORAL DAILY
Qty: 90 | Refills: 0 | Status: ACTIVE | COMMUNITY
Start: 2022-04-14

## 2022-04-14 RX ORDER — ELECTROLYTES/DEXTROSE
SOLUTION, ORAL ORAL DAILY
Qty: 30 | Refills: 0 | Status: ACTIVE | COMMUNITY
Start: 2022-04-14

## 2022-04-29 ENCOUNTER — APPOINTMENT (OUTPATIENT)
Dept: MRI IMAGING | Facility: CLINIC | Age: 82
End: 2022-04-29
Payer: MEDICARE

## 2022-04-29 ENCOUNTER — OUTPATIENT (OUTPATIENT)
Dept: OUTPATIENT SERVICES | Facility: HOSPITAL | Age: 82
LOS: 1 days | End: 2022-04-29
Payer: MEDICARE

## 2022-04-29 DIAGNOSIS — R20.0 ANESTHESIA OF SKIN: ICD-10-CM

## 2022-04-29 DIAGNOSIS — M21.372 FOOT DROP, LEFT FOOT: ICD-10-CM

## 2022-04-29 PROCEDURE — 72156 MRI NECK SPINE W/O & W/DYE: CPT | Mod: MG

## 2022-04-29 PROCEDURE — 72157 MRI CHEST SPINE W/O & W/DYE: CPT | Mod: MG

## 2022-04-29 PROCEDURE — 72156 MRI NECK SPINE W/O & W/DYE: CPT | Mod: 26,MG

## 2022-04-29 PROCEDURE — G1004: CPT

## 2022-04-29 PROCEDURE — 72157 MRI CHEST SPINE W/O & W/DYE: CPT | Mod: 26,MG

## 2022-04-29 PROCEDURE — 72158 MRI LUMBAR SPINE W/O & W/DYE: CPT | Mod: 26,MG

## 2022-04-29 PROCEDURE — 72158 MRI LUMBAR SPINE W/O & W/DYE: CPT | Mod: MG

## 2022-04-29 PROCEDURE — A9585: CPT

## 2022-05-03 NOTE — DATA REVIEWED
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

## 2022-05-03 NOTE — HISTORY OF PRESENT ILLNESS
[FreeTextEntry1] : This is an 82-year-old right-handed man who has been experiencing numbness throughout both feet, extending up to the mid-calf level, for at least 7 years. He saw a neurologist, Dr. Vikas Berger, about 6 years ago for these symptoms, and was found to have diffuse polyneuropathy involving both legs on EMG/NCV. The patient also had a left foot drop for many years, and notes that his mother also had a severe foot drop requiring orthotics.

## 2022-05-03 NOTE — PHYSICAL EXAM
[General Appearance - Alert] : alert [General Appearance - In No Acute Distress] : in no acute distress [Oriented To Time, Place, And Person] : oriented to person, place, and time [Impaired Insight] : insight and judgment were intact [Affect] : the affect was normal [Person] : oriented to person [Place] : oriented to place [Time] : oriented to time [Concentration Intact] : normal concentrating ability [Visual Intact] : visual attention was ~T not ~L decreased [Fluency] : fluency intact [Comprehension] : comprehension intact [Cranial Nerves Optic (II)] : visual acuity intact bilaterally,  visual fields full to confrontation, pupils equal round and reactive to light [Cranial Nerves Oculomotor (III)] : extraocular motion intact [Cranial Nerves Trigeminal (V)] : facial sensation intact symmetrically [Cranial Nerves Facial (VII)] : face symmetrical [Cranial Nerves Vestibulocochlear (VIII)] : hearing was intact bilaterally [Cranial Nerves Glossopharyngeal (IX)] : tongue and palate midline [Cranial Nerves Accessory (XI - Cranial And Spinal)] : head turning and shoulder shrug symmetric [Motor Tone] : muscle tone was normal in all four extremities [Cranial Nerves Hypoglossal (XII)] : there was no tongue deviation with protrusion [Motor Strength] : muscle strength was normal in all four extremities [No Muscle Atrophy] : normal bulk in all four extremities [Motor Handedness Right-Handed] : the patient is right hand dominant [Paresis Pronator Drift Right-Sided] : no pronator drift on the right [Paresis Pronator Drift Left-Sided] : no pronator drift on the left [Motor Strength Upper Extremities Bilaterally] : strength was normal in both upper extremities [Motor Strength Lower Extremities Bilaterally] : there was weakness in both lower extremities [5] : knee flexion 5/5 [4] : knee extension 4/5 [2] : ankle dorsiflexion 2/5 [3] : ankle plantar flexion 3/5 [Romberg's Sign] : Romberg's sign was negtive [Allodynia] : no ~T allodynia present [Tactile Decrease Shoulders Right] : normal right shoulder [Tactile Decrease Shoulders Left] : normal left shoulder [Tactile Decrease Entire Right Arm] : normal right arm [Tactile Decrease Entire Left Arm] : normal left arm [Tactile Decrease Hand] : normal left hand [Tactile Decrease Entire Leg Right] : diminished right leg [Tactile Decrease Entire Leg Left] : diminished left leg [Pain / Temperature Decrease Shoulders Right Only] : normal right shoulder [Pain / Temperature Decrease Shoulders Left Only] : normal left shoulder [Pain / Temperature Decrease Entire Arm Right] : normal right arm [Pain / Temperature Decrease Entire Arm Left] : normal left arm [Pain / Temp Decrease Hand] : normal left hand [Pain / Temp Decrease Entire Leg - Right] : diminished right leg [Pain / Temp Decrease Entire Leg - Left] : diminished left leg [Abnormal Walk] : normal gait [Balance] : balance was intact [Past-pointing] : there was no past-pointing [Tremor] : no tremor present [Coordination - Dysmetria Impaired Finger-to-Nose Bilateral] : not present [Coordination - Dysmetria Impaired Heel-to-Shin Bilateral] : not present [2+] : Triceps left 2+ [1+] : Patella left 1+ [0] : Ankle jerk left 0 [Plantar Reflex Right Only] : normal on the right [___] : absent on the right [Plantar Reflex Left Only] : normal on the left [___] : absent on the left [FreeTextEntry8] : Normal, narrow-based gait. Unable to initiate specialized gaits due to left foot drop. [Sclera] : the sclera and conjunctiva were normal [PERRL With Normal Accommodation] : pupils were equal in size, round, reactive to light, with normal accommodation [Extraocular Movements] : extraocular movements were intact [Outer Ear] : the ears and nose were normal in appearance [Oropharynx] : the oropharynx was normal [Neck Appearance] : the appearance of the neck was normal [Auscultation Breath Sounds / Voice Sounds] : lungs were clear to auscultation bilaterally [Heart Rate And Rhythm] : heart rate was normal and rhythm regular [Heart Sounds] : normal S1 and S2 [Arterial Pulses Carotid] : carotid pulses were normal with no bruits [Full Pulse] : the pedal pulses are present [Abdomen Soft] : soft [Abdomen Tenderness] : non-tender [No CVA Tenderness] : no ~M costovertebral angle tenderness [No Spinal Tenderness] : no spinal tenderness [Nail Clubbing] : no clubbing  or cyanosis of the fingernails [Skin Color & Pigmentation] : normal skin color and pigmentation [] : no rash

## 2022-05-11 ENCOUNTER — NON-APPOINTMENT (OUTPATIENT)
Age: 82
End: 2022-05-11

## 2022-05-19 ENCOUNTER — NON-APPOINTMENT (OUTPATIENT)
Age: 82
End: 2022-05-19

## 2022-06-13 ENCOUNTER — RX RENEWAL (OUTPATIENT)
Age: 82
End: 2022-06-13

## 2022-06-15 ENCOUNTER — APPOINTMENT (OUTPATIENT)
Dept: DERMATOLOGY | Facility: CLINIC | Age: 82
End: 2022-06-15

## 2022-06-15 ENCOUNTER — APPOINTMENT (OUTPATIENT)
Dept: FAMILY MEDICINE | Facility: CLINIC | Age: 82
End: 2022-06-15
Payer: MEDICARE

## 2022-06-15 VITALS
SYSTOLIC BLOOD PRESSURE: 126 MMHG | HEIGHT: 72 IN | TEMPERATURE: 98 F | DIASTOLIC BLOOD PRESSURE: 82 MMHG | BODY MASS INDEX: 25.47 KG/M2 | OXYGEN SATURATION: 99 % | WEIGHT: 188 LBS | HEART RATE: 77 BPM

## 2022-06-15 PROCEDURE — G0439: CPT

## 2022-06-15 PROCEDURE — 36415 COLL VENOUS BLD VENIPUNCTURE: CPT

## 2022-06-15 NOTE — PHYSICAL EXAM
[Normal Sclera/Conjunctiva] : normal sclera/conjunctiva [Normal Oropharynx] : the oropharynx was normal [No Lymphadenopathy] : no lymphadenopathy [No Edema] : there was no peripheral edema [No Extremity Clubbing/Cyanosis] : no extremity clubbing/cyanosis [Normal] : affect was normal and insight and judgment were intact [Normal TMs] : both tympanic membranes were normal

## 2022-06-15 NOTE — HEALTH RISK ASSESSMENT
[Never] : Never [Yes] : Yes [4 or more  times a week (4 pts)] : 4 or more  times a week (4 points) [# of Members in Household ___] :  household currently consist of [unfilled] member(s) [] :  [YearQuit] : 33 years ago [de-identified] : 1 a day [de-identified] : tries to stay active [de-identified] : Varied diet [de-identified] : reading glasses-spring 2022 [de-identified] : dentist up to date

## 2022-06-15 NOTE — HISTORY OF PRESENT ILLNESS
[FreeTextEntry1] : Mr. STEVEN presents for annual physical.\par  [de-identified] : Mr. STEVEN presents for annual physical.\par \par Going back to Dermatology-needs to have mohs procedure on nose. \par Saw neurology-has to follow-up with specialist. Saw Cardio, had echo earlier this year.\par \par Some pollen allergies this season.\par Notes he has been waking up 4 times a night to void, no pain. \par Has only been taking Quinapril 1 time a day; has BP cuff at home, when checking was in 120s-130s.  \par COVID vaccines and boosters received.  \par

## 2022-06-15 NOTE — REVIEW OF SYSTEMS
[Frequency] : frequency [Negative] : Gastrointestinal [Fever] : no fever [Chills] : no chills [Dysuria] : no dysuria [Hematuria] : no hematuria [Headache] : no headache [Dizziness] : no dizziness [FreeTextEntry8] : 4 times a night; watching fluid intake at bedtime  [de-identified] : 6.5 hours of sleep per night.

## 2022-06-15 NOTE — PLAN
[FreeTextEntry1] : Fasting.\par Will follow up labwork drawn in office today.\par \par Urinary Frequency-check UA, PSA.  Discussed Urology evaluation. \par \par Will adjust meds based on labs. \par Patient expressed understanding of plan.\par \par \par

## 2022-06-16 LAB
ALBUMIN SERPL ELPH-MCNC: 4.8 G/DL
ALP BLD-CCNC: 54 U/L
ALT SERPL-CCNC: 14 U/L
ANION GAP SERPL CALC-SCNC: 16 MMOL/L
APPEARANCE: CLEAR
AST SERPL-CCNC: 20 U/L
BASOPHILS # BLD AUTO: 0.03 K/UL
BASOPHILS NFR BLD AUTO: 0.5 %
BILIRUB SERPL-MCNC: 0.6 MG/DL
BILIRUBIN URINE: NEGATIVE
BLOOD URINE: NEGATIVE
BUN SERPL-MCNC: 23 MG/DL
CALCIUM SERPL-MCNC: 9.9 MG/DL
CHLORIDE SERPL-SCNC: 99 MMOL/L
CHOLEST SERPL-MCNC: 168 MG/DL
CO2 SERPL-SCNC: 26 MMOL/L
COLOR: YELLOW
CREAT SERPL-MCNC: 0.91 MG/DL
EGFR: 85 ML/MIN/1.73M2
EOSINOPHIL # BLD AUTO: 0.32 K/UL
EOSINOPHIL NFR BLD AUTO: 5 %
GLUCOSE QUALITATIVE U: NEGATIVE
GLUCOSE SERPL-MCNC: 90 MG/DL
HCT VFR BLD CALC: 41.1 %
HDLC SERPL-MCNC: 69 MG/DL
HGB BLD-MCNC: 13.6 G/DL
IMM GRANULOCYTES NFR BLD AUTO: 0.2 %
KETONES URINE: NEGATIVE
LDLC SERPL CALC-MCNC: 86 MG/DL
LEUKOCYTE ESTERASE URINE: NEGATIVE
LYMPHOCYTES # BLD AUTO: 1.58 K/UL
LYMPHOCYTES NFR BLD AUTO: 24.8 %
MAN DIFF?: NORMAL
MCHC RBC-ENTMCNC: 32.5 PG
MCHC RBC-ENTMCNC: 33.1 GM/DL
MCV RBC AUTO: 98.1 FL
MONOCYTES # BLD AUTO: 0.58 K/UL
MONOCYTES NFR BLD AUTO: 9.1 %
NEUTROPHILS # BLD AUTO: 3.84 K/UL
NEUTROPHILS NFR BLD AUTO: 60.4 %
NITRITE URINE: NEGATIVE
NONHDLC SERPL-MCNC: 99 MG/DL
PH URINE: 6.5
PLATELET # BLD AUTO: 179 K/UL
POTASSIUM SERPL-SCNC: 4.4 MMOL/L
PROT SERPL-MCNC: 7.5 G/DL
PROTEIN URINE: NEGATIVE
PSA SERPL-MCNC: 2.85 NG/ML
RBC # BLD: 4.19 M/UL
RBC # FLD: 13.8 %
SODIUM SERPL-SCNC: 141 MMOL/L
SPECIFIC GRAVITY URINE: 1.02
TRIGL SERPL-MCNC: 66 MG/DL
TSH SERPL-ACNC: 2.28 UIU/ML
UROBILINOGEN URINE: NORMAL
WBC # FLD AUTO: 6.36 K/UL

## 2022-06-17 ENCOUNTER — APPOINTMENT (OUTPATIENT)
Dept: UROLOGY | Facility: CLINIC | Age: 82
End: 2022-06-17
Payer: MEDICARE

## 2022-06-17 ENCOUNTER — NON-APPOINTMENT (OUTPATIENT)
Age: 82
End: 2022-06-17

## 2022-06-17 ENCOUNTER — APPOINTMENT (OUTPATIENT)
Dept: UROLOGY | Facility: CLINIC | Age: 82
End: 2022-06-17

## 2022-06-17 VITALS
TEMPERATURE: 98.8 F | HEIGHT: 72 IN | WEIGHT: 188 LBS | HEART RATE: 72 BPM | SYSTOLIC BLOOD PRESSURE: 156 MMHG | BODY MASS INDEX: 25.47 KG/M2 | OXYGEN SATURATION: 98 % | DIASTOLIC BLOOD PRESSURE: 78 MMHG

## 2022-06-17 DIAGNOSIS — N52.9 MALE ERECTILE DYSFUNCTION, UNSPECIFIED: ICD-10-CM

## 2022-06-17 PROCEDURE — 76872 US TRANSRECTAL: CPT

## 2022-06-17 PROCEDURE — 99203 OFFICE O/P NEW LOW 30 MIN: CPT

## 2022-06-17 NOTE — REVIEW OF SYSTEMS
[Poor quality erections] : Poor quality erections [Wait a long time to urinate] : waits a long time to urinate [Slow urine stream] : slow urine stream [Leakage of urine with urgency] : leakage of urine with urgency [Leakage of urine with straining, coughing, laughing] : leakage of urine with straining, coughing, laughing [Unaware of when urine is leaking] : unaware of when urine is leaking [Limb Weakness] : limb weakness [Negative] : Heme/Lymph

## 2022-06-17 NOTE — ASSESSMENT
[FreeTextEntry1] : 83y/o male with LUTS, nicturia, sensation of incomplete emptying since around 2 years.\par Refers ED.\par Prostate gland dimensions X2.5, regular margins, regular consistency, no nodules, no pain reported upon pressure. \par \par US prostate transrectal: volume 78cc\par \par Ordering flomax 0.4mg 1/day for 1 month.\par Will F/U in 3 weeks for integration with uroflow and evaluation if med therapy or TURP.\par

## 2022-06-17 NOTE — HISTORY OF PRESENT ILLNESS
[FreeTextEntry1] : 81y/o male with LUTS, nicturia, sensation of incomplete emptying since around 2 years.

## 2022-06-30 ENCOUNTER — RX RENEWAL (OUTPATIENT)
Age: 82
End: 2022-06-30

## 2022-07-12 ENCOUNTER — APPOINTMENT (OUTPATIENT)
Dept: UROLOGY | Facility: CLINIC | Age: 82
End: 2022-07-12

## 2022-08-22 ENCOUNTER — APPOINTMENT (OUTPATIENT)
Dept: NEUROLOGY | Facility: CLINIC | Age: 82
End: 2022-08-22

## 2022-08-22 PROCEDURE — 95886 MUSC TEST DONE W/N TEST COMP: CPT

## 2022-08-22 PROCEDURE — 95910 NRV CNDJ TEST 7-8 STUDIES: CPT

## 2022-08-24 ENCOUNTER — APPOINTMENT (OUTPATIENT)
Dept: NEUROLOGY | Facility: CLINIC | Age: 82
End: 2022-08-24

## 2022-08-24 VITALS
BODY MASS INDEX: 26.95 KG/M2 | DIASTOLIC BLOOD PRESSURE: 78 MMHG | SYSTOLIC BLOOD PRESSURE: 142 MMHG | WEIGHT: 199 LBS | HEIGHT: 72 IN | HEART RATE: 63 BPM

## 2022-08-24 PROCEDURE — 99214 OFFICE O/P EST MOD 30 MIN: CPT

## 2022-08-24 NOTE — REVIEW OF SYSTEMS
[Numbness] : numbness [Difficulty Walking] : difficulty walking [Constipation] : constipation [Nocturia] : nocturia [Negative] : Heme/Lymph

## 2022-08-26 ENCOUNTER — LABORATORY RESULT (OUTPATIENT)
Age: 82
End: 2022-08-26

## 2022-08-26 ENCOUNTER — TRANSCRIPTION ENCOUNTER (OUTPATIENT)
Age: 82
End: 2022-08-26

## 2022-08-26 NOTE — HISTORY OF PRESENT ILLNESS
[FreeTextEntry1] : Mr. Christofer Allred is an 82-year-old right-handed gentleman who was referred for neurologic evaluation at your kind suggestion.\par \par Mr. Allred complains of painless numbness of his feet for 8 years.  He has noted more recent imbalance.  He denies extremity weakness, muscular atrophy, cramping or tremor.  Several years ago, he underwent bilateral carpal tunnel releases in response to hand tingling and pain.  He has mild residual finger numbness and impaired dexterity.  He has mild constipation.\par \par He denies significant neck or back pain.  He complains of severe nocturia and mild erectile dysfunction.  He was recently seen by a urologist and prescribed Flomax.\par \par He underwent recent imaging of the cervical, thoracic and lumbar spine.  There was no abnormal cord signal.  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  At C4-5, there was moderate to severe spinal stenosis and mild to moderate spinal stenosis at C3-4.  At L4-5 and L5-S1, there was moderate severe spinal canal stenosis and at L2-3 and L3-4, there was moderate spinal stenosis.  There was variable neuroforaminal stenosis of the cervical and lumbar spine.  There was severe bladder distention.\par \par He underwent a nerve conduction study performed by Dr. Vikas Berger in 2016.  The raw data was not provided but the right median and ulnar nerve was conducted in the mid 30 m/s range.  The left median and ulnar nerves conducted at 50 m/s.  A lower extremity study revealed prolonged tibial distal latencies and low amplitude peroneal and tibial compound muscle action potentials.  Conduction velocities were moderately slow.  Neuropathic motor potential were described in the lower extremities.\par \par Past surgical history is notable for appendectomy and bilateral carpal tunnel releases.  Past medical history includes hypertension, hyperlipidemia and hypothyroidism.  There is no history of diabetes, cardiac, pulmonary, renal, hepatic, gastrointestinal, hematologic, cerebrovascular or oncologic disease.  He has no allergies.  Medications include hydrochlorothiazide, quinapril, simvastatin, levothyroxine, aspirin, calcium, vitamin D, multivitamin and super B.\par \par He is a former smoker and social drinker.  He is  and is retired .  Family history is notable for a mother who suffered from sciatica and then later life had a foot drop.  He has a daughter who suffers from possible Lee's neuromas.\par \par

## 2022-08-26 NOTE — PHYSICAL EXAM
[FreeTextEntry1] : Constitutional:  Patient was well-developed, well-nourished and in no acute distress. \par \par Head:  Normocephalic, atraumatic. Tympanic membranes were clear. \par \par Neck:  Supple with full range of motion. \par \par Cardiovascular:  Cardiac rhythm was regular without murmur. There were no carotid bruits. Peripheral pulses were full and symmetric. \par \par Respiratory:  Lungs were clear. \par \par Abdomen:  Soft and nontender. \par \par Spine:  Nontender. \par \par Skin:  There were no rashes. \par \par NEUROLOGICAL EXAMINATION:\par \par Mental Status: Patient was alert and oriented. Speech was fluent. There was no dysarthria. \par \par Cranial Nerves: \par \par II: Visual acuity was 20/25-2 in the right eye and 20/25-1 in left eye with glasses and near card. Pupils were equal and reactive. Visual fields were full. Funduscopic examination was normal. \par \par III, IV, VI:  Eye movements were full without nystagmus. \par \par V: Facial sensation was intact. \par \par VII: Facial strength was normal. \par \par VIII: Hearing was equal. \par \par IX, X: Palatal movement was normal. Phonation was normal. \par \par XI: Sternocleidomastoids and trapezii were normal. \par \par XII: Tongue was midline and movements normal. There was no lingual atrophy or fasciculations. \par \par Motor Examination: Muscle bulk, tone and strength were normal.  He had mild difficulty standing on his left more than right heel.  There was no tremor.\par \par Sensory Examination: Joint position sense was intact.  Vibration sense was absent in the feet but intact at the knees and hands.  There was shading of pinprick in a stocking glove distribution.\par \par Reflexes: DTRs were 2 at the knees, 1 at the ankles and absent in the upper extremities.\par \par Plantar Responses: Plantar responses were withdrawal. \par \par Coordination/Cerebellar Function: There was no dysmetria on finger to nose or heel to shin testing. \par \par Gait/Stance: Gait was normal. Romberg was negative.  Tandem was mildly unsteady.\par

## 2022-08-26 NOTE — ASSESSMENT
[FreeTextEntry1] : Mr. Allred is an 82-year-old who presents with longstanding distal pedal sensory complaints, more recent imbalance and history of upper extremity entrapment neuropathies.  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  His mother's history of foot drop raises suspicion of a hereditary polyneuropathy.\par \par I suggested that he undergo a serologic evaluation including a hereditary neuropathy panel.  Further management will depend upon those results and his clinical course.

## 2022-08-27 ENCOUNTER — NON-APPOINTMENT (OUTPATIENT)
Age: 82
End: 2022-08-27

## 2022-08-29 ENCOUNTER — TRANSCRIPTION ENCOUNTER (OUTPATIENT)
Age: 82
End: 2022-08-29

## 2022-08-30 LAB
ALBUMIN MFR SERPL ELPH: 59 %
ALBUMIN SERPL-MCNC: 4.1 G/DL
ALBUMIN/GLOB SERPL: 1.5 RATIO
ALPHA1 GLOB MFR SERPL ELPH: 3.8 %
ALPHA1 GLOB SERPL ELPH-MCNC: 0.3 G/DL
ALPHA2 GLOB MFR SERPL ELPH: 9.4 %
ALPHA2 GLOB SERPL ELPH-MCNC: 0.6 G/DL
B-GLOBULIN MFR SERPL ELPH: 10.9 %
B-GLOBULIN SERPL ELPH-MCNC: 0.8 G/DL
DEPRECATED KAPPA LC FREE/LAMBDA SER: 1.16 RATIO
GAMMA GLOB FLD ELPH-MCNC: 1.2 G/DL
GAMMA GLOB MFR SERPL ELPH: 16.9 %
IGA SER QL IEP: 330 MG/DL
IGG SER QL IEP: 1181 MG/DL
IGM SER QL IEP: 58 MG/DL
INTERPRETATION SERPL IEP-IMP: NORMAL
KAPPA LC CSF-MCNC: 1.72 MG/DL
KAPPA LC SERPL-MCNC: 2 MG/DL
M PROTEIN SPEC IFE-MCNC: NORMAL
PROT SERPL-MCNC: 6.9 G/DL
PROT SERPL-MCNC: 6.9 G/DL

## 2022-09-01 LAB
ASIALO-GM1 ANTIBODIES, IGG/IGM: 9 IV
GD1A ANTIBODIES, IGG/IGM: 17 IV
GD1B ANTIBODIES, IGG/IGM: 15 IV
GM1 ANTIBODIES, IGG/IGM: 10 IV
GM2 ANTIBODIES, IGG/IGM: 12 IV
GQ1B ANTIBODIES, IGG/IGM: 10 IV
IGA 24H UR QL IFE: NORMAL
SULFATIDE AB SER QL: NORMAL
SULFATIDE ANTIBODIES COMMENTS: NORMAL
SULFATIDE ANTIBODIES METHODS: NORMAL
SULFATIDE ANTIBODIES REFERENCES: NORMAL
SULFATIDE ANTIBODIES TECHNICAL RESULTS: NORMAL

## 2022-09-03 LAB
AMPA-R ABCBA: NEGATIVE
AMPHIPHYSIN IGG TITR SER IF: NEGATIVE TITER
CASPR2-IGG CBA, S: NEGATIVE
CV2 IGG TITR SER: NEGATIVE TITER
GABA-B ABCBA: NEGATIVE
GAD65 AB SER-MCNC: 0 NMOL/L
GLIAL NUC TYPE 1 AB TITR SER: NEGATIVE TITER
HU1 AB TITR SER: NEGATIVE TITER
HU2 AB TITR SER IF: NEGATIVE TITER
HU3 AB TITR SER: NEGATIVE TITER
IGLON5 IFA, S: NEGATIVE
IMMUNOLOGIST REVIEW: NORMAL
LGI1-IGG CBA, S: NEGATIVE
MAG AB SER QL: NEGATIVE
NIF IFA, S: NEGATIVE
NMDA-R ABCBA: NEGATIVE
PCA-1 AB TITR SER: NEGATIVE TITER
PCA-2 AB TITR SER: NEGATIVE TITER
PCA-TR AB TITR SER: NEGATIVE TITER
REFLEX ADDED: NORMAL

## 2022-09-06 LAB
25(OH)D3 SERPL-MCNC: 47.9 NG/ML
A-TOCOPHEROL VIT E SERPL-MCNC: 11.7 MG/L
ALBUMIN SERPL ELPH-MCNC: 4.6 G/DL
ALP BLD-CCNC: 63 U/L
ALT SERPL-CCNC: 15 U/L
ANA PAT FLD IF-IMP: ABNORMAL
ANA SER IF-ACNC: ABNORMAL
ANION GAP SERPL CALC-SCNC: 12 MMOL/L
AST SERPL-CCNC: 22 U/L
BASOPHILS # BLD AUTO: 0.03 K/UL
BASOPHILS NFR BLD AUTO: 0.5 %
BETA+GAMMA TOCOPHEROL SERPL-MCNC: 1 MG/L
BILIRUB SERPL-MCNC: 0.4 MG/DL
BUN SERPL-MCNC: 25 MG/DL
CALCIUM SERPL-MCNC: 10 MG/DL
CHLORIDE SERPL-SCNC: 101 MMOL/L
CO2 SERPL-SCNC: 27 MMOL/L
COPPER SERPL-MCNC: 99 UG/DL
CREAT SERPL-MCNC: 0.97 MG/DL
CRP SERPL-MCNC: <3 MG/L
EGFR: 79 ML/MIN/1.73M2
EOSINOPHIL # BLD AUTO: 0.22 K/UL
EOSINOPHIL NFR BLD AUTO: 3.4 %
ERYTHROCYTE [SEDIMENTATION RATE] IN BLOOD BY WESTERGREN METHOD: 12 MM/HR
ESTIMATED AVERAGE GLUCOSE: 105 MG/DL
FOLATE SERPL-MCNC: >20 NG/ML
GLUCOSE SERPL-MCNC: 89 MG/DL
HBA1C MFR BLD HPLC: 5.3 %
HCT VFR BLD CALC: 39.2 %
HGB BLD-MCNC: 12.7 G/DL
HOMOCYSTEINE LEVEL: 10.2 UMOL/L
IMM GRANULOCYTES NFR BLD AUTO: 0.3 %
LYMPHOCYTES # BLD AUTO: 1.53 K/UL
LYMPHOCYTES NFR BLD AUTO: 23.4 %
MAGNESIUM SERPL-MCNC: 2.1 MG/DL
MAN DIFF?: NORMAL
MCHC RBC-ENTMCNC: 32.1 PG
MCHC RBC-ENTMCNC: 32.4 GM/DL
MCV RBC AUTO: 99 FL
METHYLMALONIC ACID LEVEL: 147 NMOL/L
MONOCYTES # BLD AUTO: 0.6 K/UL
MONOCYTES NFR BLD AUTO: 9.2 %
NEUTROPHILS # BLD AUTO: 4.15 K/UL
NEUTROPHILS NFR BLD AUTO: 63.2 %
PHOSPHATE SERPL-MCNC: 3.5 MG/DL
PLATELET # BLD AUTO: 192 K/UL
POTASSIUM SERPL-SCNC: 5.2 MMOL/L
PROT SERPL-MCNC: 7.1 G/DL
RBC # BLD: 3.96 M/UL
RBC # FLD: 13 %
SODIUM SERPL-SCNC: 140 MMOL/L
T PALLIDUM AB SER QL IA: NEGATIVE
TSH SERPL-ACNC: 3.2 UIU/ML
VIT B1 SERPL-MCNC: 180.9 NMOL/L
VIT B12 SERPL-MCNC: 1530 PG/ML
VIT B6 SERPL-MCNC: 27.5 UG/L
WBC # FLD AUTO: 6.55 K/UL
ZINC SERPL-MCNC: 68 UG/DL

## 2022-09-27 ENCOUNTER — APPOINTMENT (OUTPATIENT)
Dept: NEUROLOGY | Facility: CLINIC | Age: 82
End: 2022-09-27

## 2022-09-27 VITALS
WEIGHT: 205 LBS | DIASTOLIC BLOOD PRESSURE: 82 MMHG | SYSTOLIC BLOOD PRESSURE: 150 MMHG | HEART RATE: 55 BPM | BODY MASS INDEX: 27.77 KG/M2 | HEIGHT: 72 IN

## 2022-09-27 PROCEDURE — 99215 OFFICE O/P EST HI 40 MIN: CPT

## 2022-09-27 NOTE — ASSESSMENT
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

## 2022-09-27 NOTE — HISTORY OF PRESENT ILLNESS
[FreeTextEntry1] : FROM 4/14/22:\par This is an 82-year-old right-handed man who has been experiencing numbness throughout both feet, extending up to the mid-calf level, for at least 7 years. He saw a neurologist, Dr. Vikas Berger, about 6 years ago for these symptoms, and was found to have diffuse polyneuropathy involving both legs on EMG/NCV. The patient also had a left foot drop for many years, and notes that his mother also had a severe foot drop requiring orthotics.\par \par FROM 9/26/22:\par Since I last saw the patient on 4/14/22, he has been evaluated by Neuromuscular specialist, Dr. Mau Seymour, and has undergone additional testing. He continues to experience persistent numbness throughout both feet, as well as varying intensity of tightness in both feet. He has been experiencing more difficulty with maintaining his balance when walking, but he has not experienced any recent.falls.

## 2022-09-27 NOTE — PHYSICAL EXAM
[General Appearance - Alert] : alert [General Appearance - In No Acute Distress] : in no acute distress [Oriented To Time, Place, And Person] : oriented to person, place, and time [Impaired Insight] : insight and judgment were intact [Affect] : the affect was normal [Person] : oriented to person [Place] : oriented to place [Time] : oriented to time [Concentration Intact] : normal concentrating ability [Visual Intact] : visual attention was ~T not ~L decreased [Fluency] : fluency intact [Comprehension] : comprehension intact [Cranial Nerves Optic (II)] : visual acuity intact bilaterally,  visual fields full to confrontation, pupils equal round and reactive to light [Cranial Nerves Oculomotor (III)] : extraocular motion intact [Cranial Nerves Trigeminal (V)] : facial sensation intact symmetrically [Cranial Nerves Facial (VII)] : face symmetrical [Cranial Nerves Vestibulocochlear (VIII)] : hearing was intact bilaterally [Cranial Nerves Glossopharyngeal (IX)] : tongue and palate midline [Cranial Nerves Accessory (XI - Cranial And Spinal)] : head turning and shoulder shrug symmetric [Cranial Nerves Hypoglossal (XII)] : there was no tongue deviation with protrusion [Motor Tone] : muscle tone was normal in all four extremities [Motor Strength] : muscle strength was normal in all four extremities [No Muscle Atrophy] : normal bulk in all four extremities [Motor Handedness Right-Handed] : the patient is right hand dominant [Motor Strength Lower Extremities Bilaterally] : there was weakness in both lower extremities [5] : knee flexion 5/5 [4] : knee extension 4/5 [2] : ankle dorsiflexion 2/5 [3] : ankle plantar flexion 3/5 [Tactile Decrease Entire Leg Right] : diminished right leg [Tactile Decrease Entire Leg Left] : diminished left leg [Pain / Temp Decrease Entire Leg - Right] : diminished right leg [Pain / Temp Decrease Entire Leg - Left] : diminished left leg [Abnormal Walk] : normal gait [Balance] : balance was intact [2+] : Triceps left 2+ [1+] : Patella left 1+ [0] : Ankle jerk left 0 [Sclera] : the sclera and conjunctiva were normal [PERRL With Normal Accommodation] : pupils were equal in size, round, reactive to light, with normal accommodation [Extraocular Movements] : extraocular movements were intact [Outer Ear] : the ears and nose were normal in appearance [Oropharynx] : the oropharynx was normal [Neck Appearance] : the appearance of the neck was normal [Auscultation Breath Sounds / Voice Sounds] : lungs were clear to auscultation bilaterally [Heart Rate And Rhythm] : heart rate was normal and rhythm regular [Heart Sounds] : normal S1 and S2 [Arterial Pulses Carotid] : carotid pulses were normal with no bruits [Full Pulse] : the pedal pulses are present [Abdomen Soft] : soft [Abdomen Tenderness] : non-tender [No CVA Tenderness] : no ~M costovertebral angle tenderness [No Spinal Tenderness] : no spinal tenderness [Nail Clubbing] : no clubbing  or cyanosis of the fingernails [Skin Color & Pigmentation] : normal skin color and pigmentation [] : no rash [Paresis Pronator Drift Right-Sided] : no pronator drift on the right [Paresis Pronator Drift Left-Sided] : no pronator drift on the left [Motor Strength Upper Extremities Bilaterally] : strength was normal in both upper extremities [Romberg's Sign] : Romberg's sign was negtive [Allodynia] : no ~T allodynia present [Tactile Decrease Shoulders Right] : normal right shoulder [Tactile Decrease Shoulders Left] : normal left shoulder [Tactile Decrease Entire Right Arm] : normal right arm [Tactile Decrease Entire Left Arm] : normal left arm [Tactile Decrease Hand] : normal left hand [Pain / Temperature Decrease Shoulders Right Only] : normal right shoulder [Pain / Temperature Decrease Shoulders Left Only] : normal left shoulder [Pain / Temperature Decrease Entire Arm Right] : normal right arm [Pain / Temperature Decrease Entire Arm Left] : normal left arm [Pain / Temp Decrease Hand] : normal left hand [Past-pointing] : there was no past-pointing [Tremor] : no tremor present [Coordination - Dysmetria Impaired Finger-to-Nose Bilateral] : not present [Coordination - Dysmetria Impaired Heel-to-Shin Bilateral] : not present [Plantar Reflex Right Only] : normal on the right [Plantar Reflex Left Only] : normal on the left [___] : absent on the right [___] : absent on the left [FreeTextEntry8] : Normal, narrow-based gait. Unable to initiate specialized gaits due to left foot drop.

## 2022-09-27 NOTE — DATA REVIEWED
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

## 2022-10-05 ENCOUNTER — OUTPATIENT (OUTPATIENT)
Dept: OUTPATIENT SERVICES | Facility: HOSPITAL | Age: 82
LOS: 1 days | End: 2022-10-05
Payer: MEDICARE

## 2022-10-05 ENCOUNTER — APPOINTMENT (OUTPATIENT)
Dept: UROLOGY | Facility: CLINIC | Age: 82
End: 2022-10-05

## 2022-10-05 ENCOUNTER — NON-APPOINTMENT (OUTPATIENT)
Age: 82
End: 2022-10-05

## 2022-10-05 VITALS
HEIGHT: 72 IN | BODY MASS INDEX: 26.41 KG/M2 | TEMPERATURE: 97.5 F | SYSTOLIC BLOOD PRESSURE: 124 MMHG | RESPIRATION RATE: 16 BRPM | DIASTOLIC BLOOD PRESSURE: 81 MMHG | WEIGHT: 195 LBS | HEART RATE: 87 BPM

## 2022-10-05 PROCEDURE — 76775 US EXAM ABDO BACK WALL LIM: CPT

## 2022-10-05 PROCEDURE — 76775 US EXAM ABDO BACK WALL LIM: CPT | Mod: 26

## 2022-10-05 PROCEDURE — 99214 OFFICE O/P EST MOD 30 MIN: CPT | Mod: 57,25

## 2022-10-05 PROCEDURE — 51702 INSERT TEMP BLADDER CATH: CPT | Mod: 59

## 2022-10-05 PROCEDURE — 51702 INSERT TEMP BLADDER CATH: CPT

## 2022-10-05 NOTE — REVIEW OF SYSTEMS
[Negative] : Heme/Lymph [Poor quality erections] : Poor quality erections [No erections] : no erections [Seen by urologist before (Name)  ___] : Preciously seen by a urologist: [unfilled] [Wake up at night to urinate  How many times?  ___] : wakes up to urinate [unfilled] times during the night [Strain or push to urinate] : strain or push to urinate [Wait a long time to urinate] : waits a long time to urinate [Slow urine stream] : slow urine stream [Interrupted urine stream] : interrupted urine stream [Leakage of urine with straining, coughing, laughing] : leakage of urine with straining, coughing, laughing

## 2022-10-06 ENCOUNTER — NON-APPOINTMENT (OUTPATIENT)
Age: 82
End: 2022-10-06

## 2022-10-06 LAB — HEREDITARY NEUROPATHY PANEL: ABNORMAL

## 2022-10-06 NOTE — ASSESSMENT
[FreeTextEntry1] : Braun placed  - . 1000cc clear drained. warned hi ma bout bleeding.\par will need outlet procedure - by TRUS prostate size 78cc so candidate for transurethral procedure.\par given high PVR catheter necessary for bladder rehab and may take some time after procedure to void fully.\par reviewed how performed, outcomes and side effects.

## 2022-10-06 NOTE — HISTORY OF PRESENT ILLNESS
[FreeTextEntry1] : here for management of bothersome LUts.\par had seen DV in June for h/o longstanding nocturia 3 times associated with need to double/triple void to feel empty and feeling of not being empty.  he was started on tamsulosin but only took for 3 days given severe fatigue.\par he continues same symptoms. He has some daytime frequency but no urgency, incontinence of any kind; the FOS slower and can have hesitancy.\par No dysuria or hematuria.\par PVR - 1100cc\par ULS - no hydronephrosis.

## 2022-10-07 DIAGNOSIS — R33.9 RETENTION OF URINE, UNSPECIFIED: ICD-10-CM

## 2022-10-08 LAB — COMBINED MITO GENOME PLUS MITO NUCLEAR GENE PANEL: ABNORMAL

## 2022-10-10 ENCOUNTER — OUTPATIENT (OUTPATIENT)
Dept: OUTPATIENT SERVICES | Facility: HOSPITAL | Age: 82
LOS: 1 days | End: 2022-10-10
Payer: MEDICARE

## 2022-10-10 VITALS
HEIGHT: 72 IN | WEIGHT: 192.9 LBS | OXYGEN SATURATION: 100 % | HEART RATE: 74 BPM | TEMPERATURE: 98 F | DIASTOLIC BLOOD PRESSURE: 75 MMHG | RESPIRATION RATE: 18 BRPM | SYSTOLIC BLOOD PRESSURE: 122 MMHG

## 2022-10-10 DIAGNOSIS — R33.9 RETENTION OF URINE, UNSPECIFIED: ICD-10-CM

## 2022-10-10 DIAGNOSIS — Z90.49 ACQUIRED ABSENCE OF OTHER SPECIFIED PARTS OF DIGESTIVE TRACT: Chronic | ICD-10-CM

## 2022-10-10 DIAGNOSIS — Z01.818 ENCOUNTER FOR OTHER PREPROCEDURAL EXAMINATION: ICD-10-CM

## 2022-10-10 DIAGNOSIS — Z98.890 OTHER SPECIFIED POSTPROCEDURAL STATES: Chronic | ICD-10-CM

## 2022-10-10 LAB
ANION GAP SERPL CALC-SCNC: 13 MMOL/L — SIGNIFICANT CHANGE UP (ref 5–17)
BLD GP AB SCN SERPL QL: NEGATIVE — SIGNIFICANT CHANGE UP
BUN SERPL-MCNC: 22 MG/DL — SIGNIFICANT CHANGE UP (ref 7–23)
CALCIUM SERPL-MCNC: 9.9 MG/DL — SIGNIFICANT CHANGE UP (ref 8.4–10.5)
CHLORIDE SERPL-SCNC: 94 MMOL/L — LOW (ref 96–108)
CO2 SERPL-SCNC: 27 MMOL/L — SIGNIFICANT CHANGE UP (ref 22–31)
CREAT SERPL-MCNC: 1.15 MG/DL — SIGNIFICANT CHANGE UP (ref 0.5–1.3)
EGFR: 64 ML/MIN/1.73M2 — SIGNIFICANT CHANGE UP
GLUCOSE SERPL-MCNC: 96 MG/DL — SIGNIFICANT CHANGE UP (ref 70–99)
HCT VFR BLD CALC: 41.9 % — SIGNIFICANT CHANGE UP (ref 39–50)
HGB BLD-MCNC: 14.4 G/DL — SIGNIFICANT CHANGE UP (ref 13–17)
MCHC RBC-ENTMCNC: 32.7 PG — SIGNIFICANT CHANGE UP (ref 27–34)
MCHC RBC-ENTMCNC: 34.4 GM/DL — SIGNIFICANT CHANGE UP (ref 32–36)
MCV RBC AUTO: 95.2 FL — SIGNIFICANT CHANGE UP (ref 80–100)
NRBC # BLD: 0 /100 WBCS — SIGNIFICANT CHANGE UP (ref 0–0)
PLATELET # BLD AUTO: 236 K/UL — SIGNIFICANT CHANGE UP (ref 150–400)
POTASSIUM SERPL-MCNC: 3.7 MMOL/L — SIGNIFICANT CHANGE UP (ref 3.5–5.3)
POTASSIUM SERPL-SCNC: 3.7 MMOL/L — SIGNIFICANT CHANGE UP (ref 3.5–5.3)
RBC # BLD: 4.4 M/UL — SIGNIFICANT CHANGE UP (ref 4.2–5.8)
RBC # FLD: 12.9 % — SIGNIFICANT CHANGE UP (ref 10.3–14.5)
RH IG SCN BLD-IMP: POSITIVE — SIGNIFICANT CHANGE UP
SODIUM SERPL-SCNC: 134 MMOL/L — LOW (ref 135–145)
WBC # BLD: 8.41 K/UL — SIGNIFICANT CHANGE UP (ref 3.8–10.5)
WBC # FLD AUTO: 8.41 K/UL — SIGNIFICANT CHANGE UP (ref 3.8–10.5)

## 2022-10-10 PROCEDURE — 86900 BLOOD TYPING SEROLOGIC ABO: CPT

## 2022-10-10 PROCEDURE — 87086 URINE CULTURE/COLONY COUNT: CPT

## 2022-10-10 PROCEDURE — 86901 BLOOD TYPING SEROLOGIC RH(D): CPT

## 2022-10-10 PROCEDURE — 85027 COMPLETE CBC AUTOMATED: CPT

## 2022-10-10 PROCEDURE — 86850 RBC ANTIBODY SCREEN: CPT

## 2022-10-10 PROCEDURE — G0463: CPT

## 2022-10-10 PROCEDURE — 80048 BASIC METABOLIC PNL TOTAL CA: CPT

## 2022-10-10 RX ORDER — CEFAZOLIN SODIUM 1 G
2000 VIAL (EA) INJECTION ONCE
Refills: 0 | Status: DISCONTINUED | OUTPATIENT
Start: 2022-10-24 | End: 2022-11-07

## 2022-10-10 NOTE — H&P PST ADULT - DOCUMENT STATUS
Pt discharged home with parent/guardian. Pt acting age appropriately, respirations regular and unlabored, cap refill less than two seconds. Skin pink, dry and warm. Lungs clear bilaterally. No further complaints at this time. Parent/guardian verbalized understanding of discharge paperwork and has no further questions at this time. Education provided about continuation of care, follow up care and medication administration. Parent/guardian able to provided teach back about discharge instructions.
Authored by Resident/PA/NP

## 2022-10-10 NOTE — H&P PST ADULT - FALL HARM RISK - UNIVERSAL INTERVENTIONS
Bed in lowest position, wheels locked, appropriate side rails in place/Call bell, personal items and telephone in reach/Instruct patient to call for assistance before getting out of bed or chair/Non-slip footwear when patient is out of bed/[place] to call system/Physically safe environment - no spills, clutter or unnecessary equipment/Purposeful Proactive Rounding/Room/bathroom lighting operational, light cord in reach

## 2022-10-10 NOTE — H&P PST ADULT - HISTORY OF PRESENT ILLNESS
81 y/o male with PMH of HTN, HLD, hypothyroidism , neuropathy . Pt was evaluated by neurology 2/2 neuropathy s/p MRI finding of  severe urinary retention followed by urology s/p cleaning catheter  in 10/5/22. Presents to PST  for scheduled Button Transurethral Resection on 10/24/22.    Covid test 10/21/22

## 2022-10-10 NOTE — H&P PST ADULT - PROBLEM SELECTOR PLAN 1
Pre- Op instructions discussed   labs sent   Covid test Button Transurethral Resection on 10/24/22.  Pre- Op instructions discussed   labs sent   Covid test 10/21/22

## 2022-10-10 NOTE — H&P PST ADULT - NSICDXPASTMEDICALHX_GEN_ALL_CORE_FT
PAST MEDICAL HISTORY:  History of urinary retention     HLD (hyperlipidemia)     HTN (hypertension)     Hypothyroidism

## 2022-10-11 ENCOUNTER — APPOINTMENT (OUTPATIENT)
Dept: FAMILY MEDICINE | Facility: CLINIC | Age: 82
End: 2022-10-11

## 2022-10-11 ENCOUNTER — NON-APPOINTMENT (OUTPATIENT)
Age: 82
End: 2022-10-11

## 2022-10-11 VITALS
HEART RATE: 61 BPM | DIASTOLIC BLOOD PRESSURE: 72 MMHG | OXYGEN SATURATION: 100 % | SYSTOLIC BLOOD PRESSURE: 117 MMHG | BODY MASS INDEX: 26.01 KG/M2 | HEIGHT: 72 IN | TEMPERATURE: 97.7 F | WEIGHT: 192 LBS

## 2022-10-11 DIAGNOSIS — R79.9 ABNORMAL FINDING OF BLOOD CHEMISTRY, UNSPECIFIED: ICD-10-CM

## 2022-10-11 DIAGNOSIS — Z23 ENCOUNTER FOR IMMUNIZATION: ICD-10-CM

## 2022-10-11 DIAGNOSIS — S03.40XA SPRAIN OF JAW, UNSPECIFIED SIDE, INITIAL ENCOUNTER: ICD-10-CM

## 2022-10-11 DIAGNOSIS — Z11.59 ENCOUNTER FOR SCREENING FOR OTHER VIRAL DISEASES: ICD-10-CM

## 2022-10-11 DIAGNOSIS — Z92.29 PERSONAL HISTORY OF OTHER DRUG THERAPY: ICD-10-CM

## 2022-10-11 DIAGNOSIS — M70.22 OLECRANON BURSITIS, LEFT ELBOW: ICD-10-CM

## 2022-10-11 DIAGNOSIS — Z86.19 PERSONAL HISTORY OF OTHER INFECTIOUS AND PARASITIC DISEASES: ICD-10-CM

## 2022-10-11 DIAGNOSIS — Z86.69 PERSONAL HISTORY OF OTHER DISEASES OF THE NERVOUS SYSTEM AND SENSE ORGANS: ICD-10-CM

## 2022-10-11 DIAGNOSIS — R53.81 OTHER MALAISE: ICD-10-CM

## 2022-10-11 DIAGNOSIS — Z01.818 ENCOUNTER FOR OTHER PREPROCEDURAL EXAMINATION: ICD-10-CM

## 2022-10-11 DIAGNOSIS — Z12.5 ENCOUNTER FOR SCREENING FOR MALIGNANT NEOPLASM OF PROSTATE: ICD-10-CM

## 2022-10-11 DIAGNOSIS — R53.83 OTHER MALAISE: ICD-10-CM

## 2022-10-11 DIAGNOSIS — R39.9 UNSPECIFIED SYMPTOMS AND SIGNS INVOLVING THE GENITOURINARY SYSTEM: ICD-10-CM

## 2022-10-11 DIAGNOSIS — M25.473 EFFUSION, UNSPECIFIED ANKLE: ICD-10-CM

## 2022-10-11 DIAGNOSIS — R35.0 FREQUENCY OF MICTURITION: ICD-10-CM

## 2022-10-11 LAB
CULTURE RESULTS: SIGNIFICANT CHANGE UP
SPECIMEN SOURCE: SIGNIFICANT CHANGE UP

## 2022-10-11 PROCEDURE — 99214 OFFICE O/P EST MOD 30 MIN: CPT | Mod: 25

## 2022-10-11 PROCEDURE — 93000 ELECTROCARDIOGRAM COMPLETE: CPT

## 2022-10-11 RX ORDER — AMITRIPTYLINE HYDROCHLORIDE 10 MG/1
10 TABLET, FILM COATED ORAL
Qty: 270 | Refills: 1 | Status: DISCONTINUED | COMMUNITY
Start: 2022-09-27 | End: 2022-10-11

## 2022-10-11 NOTE — HISTORY OF PRESENT ILLNESS
[No Pertinent Cardiac History] : no history of aortic stenosis, atrial fibrillation, coronary artery disease, recent myocardial infarction, or implantable device/pacemaker [No Pertinent Pulmonary History] : no history of asthma, COPD, sleep apnea, or smoking [No Adverse Anesthesia Reaction] : no adverse anesthesia reaction in self or family member [Chronic Anticoagulation] : no chronic anticoagulation [Chronic Kidney Disease] : no chronic kidney disease [Diabetes] : no diabetes [FreeTextEntry1] : TURP [FreeTextEntry2] : 10/24/22 [FreeTextEntry3] : Dr. Bell

## 2022-10-11 NOTE — PHYSICAL EXAM
[No Acute Distress] : no acute distress [Well Nourished] : well nourished [Normal Sclera/Conjunctiva] : normal sclera/conjunctiva [EOMI] : extraocular movements intact [Normal Outer Ear/Nose] : the outer ears and nose were normal in appearance [No JVD] : no jugular venous distention [Normal] : normal rate, regular rhythm, normal S1 and S2 and no murmur heard [Coordination Grossly Intact] : coordination grossly intact [Normal Affect] : the affect was normal [Alert and Oriented x3] : oriented to person, place, and time [Normal Insight/Judgement] : insight and judgment were intact [de-identified] : cleaning intact draining brown colored urine

## 2022-10-12 ENCOUNTER — LABORATORY RESULT (OUTPATIENT)
Age: 82
End: 2022-10-12

## 2022-10-12 ENCOUNTER — APPOINTMENT (OUTPATIENT)
Dept: RHEUMATOLOGY | Facility: CLINIC | Age: 82
End: 2022-10-12

## 2022-10-12 VITALS
DIASTOLIC BLOOD PRESSURE: 74 MMHG | HEART RATE: 63 BPM | HEIGHT: 72 IN | OXYGEN SATURATION: 98 % | RESPIRATION RATE: 16 BRPM | BODY MASS INDEX: 26.01 KG/M2 | TEMPERATURE: 97 F | SYSTOLIC BLOOD PRESSURE: 123 MMHG | WEIGHT: 192 LBS

## 2022-10-12 PROCEDURE — 99205 OFFICE O/P NEW HI 60 MIN: CPT

## 2022-10-13 ENCOUNTER — NON-APPOINTMENT (OUTPATIENT)
Age: 82
End: 2022-10-13

## 2022-10-14 ENCOUNTER — NON-APPOINTMENT (OUTPATIENT)
Age: 82
End: 2022-10-14

## 2022-10-14 ENCOUNTER — INPATIENT (INPATIENT)
Facility: HOSPITAL | Age: 82
LOS: 3 days | Discharge: HOME | End: 2022-10-18
Attending: FAMILY MEDICINE | Admitting: FAMILY MEDICINE

## 2022-10-14 VITALS
WEIGHT: 192.9 LBS | TEMPERATURE: 98 F | HEART RATE: 85 BPM | RESPIRATION RATE: 16 BRPM | HEIGHT: 72 IN | SYSTOLIC BLOOD PRESSURE: 102 MMHG | DIASTOLIC BLOOD PRESSURE: 65 MMHG | OXYGEN SATURATION: 96 %

## 2022-10-14 DIAGNOSIS — Z98.890 OTHER SPECIFIED POSTPROCEDURAL STATES: Chronic | ICD-10-CM

## 2022-10-14 DIAGNOSIS — Z90.49 ACQUIRED ABSENCE OF OTHER SPECIFIED PARTS OF DIGESTIVE TRACT: Chronic | ICD-10-CM

## 2022-10-14 PROCEDURE — 99285 EMERGENCY DEPT VISIT HI MDM: CPT

## 2022-10-14 NOTE — ED ADULT TRIAGE NOTE - CHIEF COMPLAINT QUOTE
pt had cleaning catheter inserted 9 days okay, pt started getting fever last night, 102.6 this evening.  tylenol 1000mg at 9pm

## 2022-10-15 PROBLEM — Z87.898 PERSONAL HISTORY OF OTHER SPECIFIED CONDITIONS: Chronic | Status: ACTIVE | Noted: 2022-10-10

## 2022-10-15 PROBLEM — E03.9 HYPOTHYROIDISM, UNSPECIFIED: Chronic | Status: ACTIVE | Noted: 2022-10-10

## 2022-10-15 PROBLEM — E78.5 HYPERLIPIDEMIA, UNSPECIFIED: Chronic | Status: ACTIVE | Noted: 2022-10-10

## 2022-10-15 PROBLEM — I10 ESSENTIAL (PRIMARY) HYPERTENSION: Chronic | Status: ACTIVE | Noted: 2022-10-10

## 2022-10-15 LAB
ALBUMIN SERPL ELPH-MCNC: 3.2 G/DL — LOW (ref 3.3–5)
ALP SERPL-CCNC: 57 U/L — SIGNIFICANT CHANGE UP (ref 40–120)
ALT FLD-CCNC: 16 U/L — SIGNIFICANT CHANGE UP (ref 12–78)
ANION GAP SERPL CALC-SCNC: 6 MMOL/L — SIGNIFICANT CHANGE UP (ref 5–17)
APPEARANCE UR: ABNORMAL
AST SERPL-CCNC: 13 U/L — LOW (ref 15–37)
BACTERIA # UR AUTO: ABNORMAL
BASOPHILS # BLD AUTO: 0 K/UL — SIGNIFICANT CHANGE UP (ref 0–0.2)
BASOPHILS NFR BLD AUTO: 0 % — SIGNIFICANT CHANGE UP (ref 0–2)
BILIRUB SERPL-MCNC: 1 MG/DL — SIGNIFICANT CHANGE UP (ref 0.2–1.2)
BILIRUB UR-MCNC: NEGATIVE — SIGNIFICANT CHANGE UP
BUN SERPL-MCNC: 17 MG/DL — SIGNIFICANT CHANGE UP (ref 7–23)
CALCIUM SERPL-MCNC: 9.3 MG/DL — SIGNIFICANT CHANGE UP (ref 8.5–10.1)
CHLORIDE SERPL-SCNC: 96 MMOL/L — SIGNIFICANT CHANGE UP (ref 96–108)
CO2 SERPL-SCNC: 29 MMOL/L — SIGNIFICANT CHANGE UP (ref 22–31)
COLOR SPEC: YELLOW — SIGNIFICANT CHANGE UP
CREAT SERPL-MCNC: 1.08 MG/DL — SIGNIFICANT CHANGE UP (ref 0.5–1.3)
DIFF PNL FLD: ABNORMAL
EGFR: 69 ML/MIN/1.73M2 — SIGNIFICANT CHANGE UP
EOSINOPHIL # BLD AUTO: 1.99 K/UL — HIGH (ref 0–0.5)
EOSINOPHIL NFR BLD AUTO: 11 % — HIGH (ref 0–6)
EPI CELLS # UR: SIGNIFICANT CHANGE UP
FLUAV AG NPH QL: SIGNIFICANT CHANGE UP
FLUBV AG NPH QL: SIGNIFICANT CHANGE UP
GLUCOSE SERPL-MCNC: 127 MG/DL — HIGH (ref 70–99)
GLUCOSE UR QL: NEGATIVE MG/DL — SIGNIFICANT CHANGE UP
HCT VFR BLD CALC: 41.2 % — SIGNIFICANT CHANGE UP (ref 39–50)
HGB BLD-MCNC: 14.2 G/DL — SIGNIFICANT CHANGE UP (ref 13–17)
KETONES UR-MCNC: ABNORMAL
LEUKOCYTE ESTERASE UR-ACNC: ABNORMAL
LYMPHOCYTES # BLD AUTO: 1.27 K/UL — SIGNIFICANT CHANGE UP (ref 1–3.3)
LYMPHOCYTES # BLD AUTO: 7 % — LOW (ref 13–44)
MANUAL SMEAR VERIFICATION: SIGNIFICANT CHANGE UP
MCHC RBC-ENTMCNC: 32 PG — SIGNIFICANT CHANGE UP (ref 27–34)
MCHC RBC-ENTMCNC: 34.5 G/DL — SIGNIFICANT CHANGE UP (ref 32–36)
MCV RBC AUTO: 92.8 FL — SIGNIFICANT CHANGE UP (ref 80–100)
MONOCYTES # BLD AUTO: 0.73 K/UL — SIGNIFICANT CHANGE UP (ref 0–0.9)
MONOCYTES NFR BLD AUTO: 4 % — SIGNIFICANT CHANGE UP (ref 2–14)
NEUTROPHILS # BLD AUTO: 14.14 K/UL — HIGH (ref 1.8–7.4)
NEUTROPHILS NFR BLD AUTO: 78 % — HIGH (ref 43–77)
NITRITE UR-MCNC: POSITIVE
NRBC # BLD: 0 /100 — SIGNIFICANT CHANGE UP (ref 0–0)
NRBC # BLD: SIGNIFICANT CHANGE UP /100 WBCS (ref 0–0)
OVALOCYTES BLD QL SMEAR: SLIGHT — SIGNIFICANT CHANGE UP
PH UR: 6 — SIGNIFICANT CHANGE UP (ref 5–8)
PLAT MORPH BLD: NORMAL — SIGNIFICANT CHANGE UP
PLATELET # BLD AUTO: 225 K/UL — SIGNIFICANT CHANGE UP (ref 150–400)
POTASSIUM SERPL-MCNC: 4.4 MMOL/L — SIGNIFICANT CHANGE UP (ref 3.5–5.3)
POTASSIUM SERPL-SCNC: 4.4 MMOL/L — SIGNIFICANT CHANGE UP (ref 3.5–5.3)
PROT SERPL-MCNC: 7.3 GM/DL — SIGNIFICANT CHANGE UP (ref 6–8.3)
PROT UR-MCNC: 100 MG/DL
RBC # BLD: 4.44 M/UL — SIGNIFICANT CHANGE UP (ref 4.2–5.8)
RBC # FLD: 12.8 % — SIGNIFICANT CHANGE UP (ref 10.3–14.5)
RBC BLD AUTO: SIGNIFICANT CHANGE UP
RBC CASTS # UR COMP ASSIST: ABNORMAL /HPF (ref 0–4)
SARS-COV-2 RNA SPEC QL NAA+PROBE: SIGNIFICANT CHANGE UP
SODIUM SERPL-SCNC: 131 MMOL/L — LOW (ref 135–145)
SP GR SPEC: 1.02 — SIGNIFICANT CHANGE UP (ref 1.01–1.02)
UROBILINOGEN FLD QL: NEGATIVE MG/DL — SIGNIFICANT CHANGE UP
WBC # BLD: 18.13 K/UL — HIGH (ref 3.8–10.5)
WBC # FLD AUTO: 18.13 K/UL — HIGH (ref 3.8–10.5)
WBC UR QL: >50

## 2022-10-15 PROCEDURE — 93010 ELECTROCARDIOGRAM REPORT: CPT

## 2022-10-15 PROCEDURE — 99223 1ST HOSP IP/OBS HIGH 75: CPT

## 2022-10-15 RX ORDER — SODIUM CHLORIDE 9 MG/ML
1000 INJECTION, SOLUTION INTRAVENOUS ONCE
Refills: 0 | Status: COMPLETED | OUTPATIENT
Start: 2022-10-15 | End: 2022-10-15

## 2022-10-15 RX ORDER — CEFTRIAXONE 500 MG/1
1000 INJECTION, POWDER, FOR SOLUTION INTRAMUSCULAR; INTRAVENOUS EVERY 24 HOURS
Refills: 0 | Status: DISCONTINUED | OUTPATIENT
Start: 2022-10-16 | End: 2022-10-18

## 2022-10-15 RX ORDER — ENOXAPARIN SODIUM 100 MG/ML
40 INJECTION SUBCUTANEOUS EVERY 24 HOURS
Refills: 0 | Status: DISCONTINUED | OUTPATIENT
Start: 2022-10-15 | End: 2022-10-18

## 2022-10-15 RX ORDER — CEFTRIAXONE 500 MG/1
1000 INJECTION, POWDER, FOR SOLUTION INTRAMUSCULAR; INTRAVENOUS ONCE
Refills: 0 | Status: COMPLETED | OUTPATIENT
Start: 2022-10-15 | End: 2022-10-15

## 2022-10-15 RX ORDER — TAMSULOSIN HYDROCHLORIDE 0.4 MG/1
0.4 CAPSULE ORAL AT BEDTIME
Refills: 0 | Status: DISCONTINUED | OUTPATIENT
Start: 2022-10-15 | End: 2022-10-18

## 2022-10-15 RX ORDER — ASPIRIN/CALCIUM CARB/MAGNESIUM 324 MG
81 TABLET ORAL DAILY
Refills: 0 | Status: DISCONTINUED | OUTPATIENT
Start: 2022-10-15 | End: 2022-10-16

## 2022-10-15 RX ORDER — LEVOTHYROXINE SODIUM 125 MCG
75 TABLET ORAL DAILY
Refills: 0 | Status: DISCONTINUED | OUTPATIENT
Start: 2022-10-15 | End: 2022-10-18

## 2022-10-15 RX ORDER — SODIUM CHLORIDE 9 MG/ML
1000 INJECTION, SOLUTION INTRAVENOUS
Refills: 0 | Status: DISCONTINUED | OUTPATIENT
Start: 2022-10-15 | End: 2022-10-16

## 2022-10-15 RX ORDER — SIMVASTATIN 20 MG/1
10 TABLET, FILM COATED ORAL AT BEDTIME
Refills: 0 | Status: DISCONTINUED | OUTPATIENT
Start: 2022-10-15 | End: 2022-10-18

## 2022-10-15 RX ADMIN — CEFTRIAXONE 100 MILLIGRAM(S): 500 INJECTION, POWDER, FOR SOLUTION INTRAMUSCULAR; INTRAVENOUS at 06:23

## 2022-10-15 RX ADMIN — ENOXAPARIN SODIUM 40 MILLIGRAM(S): 100 INJECTION SUBCUTANEOUS at 17:37

## 2022-10-15 RX ADMIN — SODIUM CHLORIDE 1000 MILLILITER(S): 9 INJECTION, SOLUTION INTRAVENOUS at 06:51

## 2022-10-15 RX ADMIN — TAMSULOSIN HYDROCHLORIDE 0.4 MILLIGRAM(S): 0.4 CAPSULE ORAL at 22:49

## 2022-10-15 RX ADMIN — SODIUM CHLORIDE 80 MILLILITER(S): 9 INJECTION, SOLUTION INTRAVENOUS at 17:37

## 2022-10-15 RX ADMIN — CEFTRIAXONE 1000 MILLIGRAM(S): 500 INJECTION, POWDER, FOR SOLUTION INTRAMUSCULAR; INTRAVENOUS at 06:53

## 2022-10-15 RX ADMIN — SIMVASTATIN 10 MILLIGRAM(S): 20 TABLET, FILM COATED ORAL at 22:49

## 2022-10-15 NOTE — ED ADULT NURSE NOTE - OBJECTIVE STATEMENT
Pt aaox4. Patient flu like symptoms.  Denies fever. Reported chills and fever. Braun inserted 9 days ago.

## 2022-10-15 NOTE — H&P ADULT - NSHPPHYSICALEXAM_GEN_ALL_CORE
Vital Signs Last 24 Hrs  T(C): 37.3 (15 Oct 2022 08:03), Max: 37.4 (15 Oct 2022 06:35)  T(F): 99.1 (15 Oct 2022 08:03), Max: 99.3 (15 Oct 2022 06:35)  HR: 104 (15 Oct 2022 08:03) (85 - 104)  BP: 107/64 (15 Oct 2022 08:03) (102/65 - 134/79)  RR: 16 (15 Oct 2022 08:03) (15 - 16)  SpO2: 95% (15 Oct 2022 08:03) (95% - 96%)    Parameters below as of 15 Oct 2022 08:03  Patient On (Oxygen Delivery Method): room air    CONSTITUTIONAL: Well appearing, well nourished, awake, alert and in no apparent distress  ENMT: Airway patent, Nasal mucosa clear. Mouth with normal mucosa. Throat has no vesicles, no oropharyngeal exudates and uvula is midline.  EYES: Clear bilaterally, pupils equal, round and reactive to light. EOMI.  CARDIAC: Normal rate, regular rhythm.  Heart sounds S1, S2.  No murmurs, rubs or gallops   RESPIRATORY: Breath sounds clear and equal bilaterally. No wheezes, rales or rhonchi  MUSCULOSKELETAL: Spine appears normal, range of motion is not limited, no muscle or joint tenderness  EXTREMITIES: No edema, cyanosis or deformity   NEUROLOGICAL: Alert and oriented, no focal deficits, no motor or sensory deficits.  SKIN: No rash, skin turgor   : leg bag [facility]

## 2022-10-15 NOTE — H&P ADULT - HISTORY OF PRESENT ILLNESS
Patient is an 83 y/o male from home w/ PMH of HTN, HLD, hypothyroidism and incomplete bladder emptying s/p Braun p/w fever x2 days. Patient reports that he had Braun cathter placed at urologist's office 9 days ago for urinary retention and is scheduled to get TURP. First few days after getting Braun, he noticed bleeding which he was told is expected and it eventually cleared. For last 2 days ,he started spiking fevers, hence, decided to visit ER. Also feels nauseous. He denies cough, chills, shortness of breath, abd pain or other complaints. Drinks vodka and wine every day-2 glasses, last drink was 5 days ago     In Ed, patient's VS were stable, BP low -normal.

## 2022-10-15 NOTE — ED ADULT NURSE REASSESSMENT NOTE - NS ED NURSE REASSESS COMMENT FT1
Pt AAOx4. Pt sitting comfortably in bed with no complaints at this time. Respirations equal and unlabored. No acute distress noted at this time.
pt becoming anxious waiting and wants to leave.  advised pt of risks of leaving without being seen.  pt &wife said they will wait a little longer.

## 2022-10-15 NOTE — ED PROVIDER NOTE - CLINICAL SUMMARY MEDICAL DECISION MAKING FREE TEXT BOX
Presenting with generalized fatigue, myalgias, possible urinary symptoms.  Nontoxic here, febrile at home.  Plan for screening labs, swab, urine, reassess.

## 2022-10-15 NOTE — ED ADULT NURSE NOTE - ED STAT RN HANDOFF DETAILS
Report received from RN at 7am. Assessment available on [facility]. will continue to monitor

## 2022-10-15 NOTE — ED PROVIDER NOTE - PHYSICAL EXAMINATION
General appearance: Nontoxic appearing, conversant, afebrile    Eyes: anicteric sclerae, DEVI, EOMI   HENT: Atraumatic; oropharynx clear, MMM and no ulcerations, no pharyngeal erythema or exudate   Neck: Trachea midline; Full range of motion, supple   Pulm: CTA bl, normal respiratory effort and no intercostal retractions, normal work of breathing   CV: RRR, No murmurs, rubs, or gallops   Abdomen: Soft, non-tender, non-distended; no guarding or rebound   Extremities: No peripheral edema, no gross deformities, FROM x4   Skin: Dry, normal temperature, turgor and texture; no rash   Psych: Appropriate affect, cooperative

## 2022-10-15 NOTE — ED PROVIDER NOTE - OBJECTIVE STATEMENT
81yo male with pmh htn, hld, hypothyroidism, presenting with fever.  102.6 pta last night.  Has felt generalized weakness, myalgias over past few days and yesterday started with fevers, chills.  Is undergoing eval for turp and had cleaning placed 9 days ago.  Noted foul smelling urine over past few days.

## 2022-10-15 NOTE — H&P ADULT - NSHPSOCIALHISTORY_GEN_ALL_CORE
From home.  Quit smoking almost 30 yrs ago.  Drinks 2 glasses of vodka and wine every day.   Denies recreational drug use

## 2022-10-16 LAB
ALBUMIN SERPL ELPH-MCNC: 2.3 G/DL — LOW (ref 3.3–5)
ALP SERPL-CCNC: 55 U/L — SIGNIFICANT CHANGE UP (ref 40–120)
ALT FLD-CCNC: 13 U/L — SIGNIFICANT CHANGE UP (ref 12–78)
ANION GAP SERPL CALC-SCNC: 8 MMOL/L — SIGNIFICANT CHANGE UP (ref 5–17)
AST SERPL-CCNC: 26 U/L — SIGNIFICANT CHANGE UP (ref 15–37)
BASOPHILS # BLD AUTO: 0.05 K/UL — SIGNIFICANT CHANGE UP (ref 0–0.2)
BASOPHILS NFR BLD AUTO: 0.3 % — SIGNIFICANT CHANGE UP (ref 0–2)
BILIRUB SERPL-MCNC: 0.7 MG/DL — SIGNIFICANT CHANGE UP (ref 0.2–1.2)
BUN SERPL-MCNC: 15 MG/DL — SIGNIFICANT CHANGE UP (ref 7–23)
CALCIUM SERPL-MCNC: 8.4 MG/DL — LOW (ref 8.5–10.1)
CHLORIDE SERPL-SCNC: 96 MMOL/L — SIGNIFICANT CHANGE UP (ref 96–108)
CO2 SERPL-SCNC: 26 MMOL/L — SIGNIFICANT CHANGE UP (ref 22–31)
CREAT SERPL-MCNC: 0.98 MG/DL — SIGNIFICANT CHANGE UP (ref 0.5–1.3)
EGFR: 78 ML/MIN/1.73M2 — SIGNIFICANT CHANGE UP
EOSINOPHIL # BLD AUTO: 0.02 K/UL — SIGNIFICANT CHANGE UP (ref 0–0.5)
EOSINOPHIL NFR BLD AUTO: 0.1 % — SIGNIFICANT CHANGE UP (ref 0–6)
GLUCOSE SERPL-MCNC: 108 MG/DL — HIGH (ref 70–99)
HCT VFR BLD CALC: 36 % — LOW (ref 39–50)
HGB BLD-MCNC: 12.3 G/DL — LOW (ref 13–17)
IMM GRANULOCYTES NFR BLD AUTO: 0.8 % — SIGNIFICANT CHANGE UP (ref 0–0.9)
LYMPHOCYTES # BLD AUTO: 1.15 K/UL — SIGNIFICANT CHANGE UP (ref 1–3.3)
LYMPHOCYTES # BLD AUTO: 6 % — LOW (ref 13–44)
MAGNESIUM SERPL-MCNC: 2.1 MG/DL — SIGNIFICANT CHANGE UP (ref 1.6–2.6)
MCHC RBC-ENTMCNC: 31.8 PG — SIGNIFICANT CHANGE UP (ref 27–34)
MCHC RBC-ENTMCNC: 34.2 G/DL — SIGNIFICANT CHANGE UP (ref 32–36)
MCV RBC AUTO: 93 FL — SIGNIFICANT CHANGE UP (ref 80–100)
MONOCYTES # BLD AUTO: 1.27 K/UL — HIGH (ref 0–0.9)
MONOCYTES NFR BLD AUTO: 6.6 % — SIGNIFICANT CHANGE UP (ref 2–14)
NEUTROPHILS # BLD AUTO: 16.63 K/UL — HIGH (ref 1.8–7.4)
NEUTROPHILS NFR BLD AUTO: 86.2 % — HIGH (ref 43–77)
NRBC # BLD: 0 /100 WBCS — SIGNIFICANT CHANGE UP (ref 0–0)
PHOSPHATE SERPL-MCNC: 1.6 MG/DL — LOW (ref 2.5–4.5)
PLATELET # BLD AUTO: 202 K/UL — SIGNIFICANT CHANGE UP (ref 150–400)
POTASSIUM SERPL-MCNC: 3.6 MMOL/L — SIGNIFICANT CHANGE UP (ref 3.5–5.3)
POTASSIUM SERPL-SCNC: 3.6 MMOL/L — SIGNIFICANT CHANGE UP (ref 3.5–5.3)
PROT SERPL-MCNC: 6 GM/DL — SIGNIFICANT CHANGE UP (ref 6–8.3)
RBC # BLD: 3.87 M/UL — LOW (ref 4.2–5.8)
RBC # FLD: 13 % — SIGNIFICANT CHANGE UP (ref 10.3–14.5)
SODIUM SERPL-SCNC: 130 MMOL/L — LOW (ref 135–145)
WBC # BLD: 19.27 K/UL — HIGH (ref 3.8–10.5)
WBC # FLD AUTO: 19.27 K/UL — HIGH (ref 3.8–10.5)

## 2022-10-16 PROCEDURE — 99232 SBSQ HOSP IP/OBS MODERATE 35: CPT

## 2022-10-16 PROCEDURE — 71045 X-RAY EXAM CHEST 1 VIEW: CPT | Mod: 26

## 2022-10-16 RX ORDER — SODIUM CHLORIDE 9 MG/ML
1000 INJECTION INTRAMUSCULAR; INTRAVENOUS; SUBCUTANEOUS
Refills: 0 | Status: DISCONTINUED | OUTPATIENT
Start: 2022-10-16 | End: 2022-10-18

## 2022-10-16 RX ORDER — SODIUM,POTASSIUM PHOSPHATES 278-250MG
2 POWDER IN PACKET (EA) ORAL
Refills: 0 | Status: COMPLETED | OUTPATIENT
Start: 2022-10-16 | End: 2022-10-18

## 2022-10-16 RX ADMIN — Medication 85 MILLIMOLE(S): at 11:41

## 2022-10-16 RX ADMIN — TAMSULOSIN HYDROCHLORIDE 0.4 MILLIGRAM(S): 0.4 CAPSULE ORAL at 21:41

## 2022-10-16 RX ADMIN — Medication 75 MICROGRAM(S): at 05:13

## 2022-10-16 RX ADMIN — CEFTRIAXONE 100 MILLIGRAM(S): 500 INJECTION, POWDER, FOR SOLUTION INTRAMUSCULAR; INTRAVENOUS at 05:14

## 2022-10-16 RX ADMIN — ENOXAPARIN SODIUM 40 MILLIGRAM(S): 100 INJECTION SUBCUTANEOUS at 18:17

## 2022-10-16 RX ADMIN — SIMVASTATIN 10 MILLIGRAM(S): 20 TABLET, FILM COATED ORAL at 21:41

## 2022-10-16 RX ADMIN — SODIUM CHLORIDE 60 MILLILITER(S): 9 INJECTION INTRAMUSCULAR; INTRAVENOUS; SUBCUTANEOUS at 17:19

## 2022-10-16 RX ADMIN — SODIUM CHLORIDE 60 MILLILITER(S): 9 INJECTION INTRAMUSCULAR; INTRAVENOUS; SUBCUTANEOUS at 21:41

## 2022-10-16 NOTE — PROGRESS NOTE ADULT - ASSESSMENT
81 y/o male from home w/ PMH of HTN, HLD, hypothyroidism and urinary retention s/p Braun p/w fever x2 days and was admitted w/ sepsis POA secondary to UTI and hyponatremia.     Complicated UTI  - UA grossly positive w/ + nitrite, LE and WBC   - s/p IVF Bolus  - c/w Rocephin   - follow urine cx and blood cx   - will consult ID if patient does not improve    Peripheral Eosinophilia:   - not present on repeat cbc w/ diff, suspect labs error     Hyponatremia  - likely due to hypovolemia  - change IVF from LR to NS    Hx of Etoh use  - will watch for Etoh withdrawal     HTN  - hold home quinapril and HCTZ as BP is borderline     Hypothyroidism   - c/w levothyroxine     HLD  - c/w Simvastatin     Urinary retention  - Braun cathter placed at urologist's office 9 days ago for urinary retention and is scheduled to get TURP  - c/w Braun cathter, advised RN to exchange it   - Urology f/u   - c/w Flomax    Prophylaxis:  DVT: Lovenox   GI: PO diet 81 y/o male from home w/ PMH of HTN, HLD, hypothyroidism and urinary retention s/p Braun p/w fever x2 days and was admitted w/ sepsis POA secondary to UTI and hyponatremia.     Complicated UTI  - UA grossly positive w/ + nitrite, LE and WBC   - s/p IVF Bolus  - c/w Rocephin   - follow urine cx and blood cx   - will consult ID if patient does not improve    Peripheral Eosinophilia  - not present on repeat cbc w/ diff, suspect labs error     Hyponatremia  - likely due to hypovolemia  - change IVF from LR to NS    hypophosphatemia  - replace w/ Phos    Hx of Etoh use  - will watch for Etoh withdrawal     HTN  - hold home quinapril and HCTZ as BP is borderline     Hypothyroidism   - c/w levothyroxine     HLD  - c/w Simvastatin     Urinary retention  - Braun cathter placed at urologist's office 9 days ago for urinary retention and is scheduled to get TURP  - c/w Braun cathter, advised RN to exchange it   - Urology f/u   - c/w Flomax    Prophylaxis:  DVT: Lovenox   GI: PO diet

## 2022-10-16 NOTE — PROGRESS NOTE ADULT - SUBJECTIVE AND OBJECTIVE BOX
Patient is a 82y old  Male who presents with a chief complaint of Sepsis UTI (15 Oct 2022 12:16)      INTERVAL HPI/OVERNIGHT EVENTS:    MEDICATIONS  (STANDING):  aspirin enteric coated 81 milliGRAM(s) Oral daily  cefTRIAXone   IVPB 1000 milliGRAM(s) IV Intermittent every 24 hours  enoxaparin Injectable 40 milliGRAM(s) SubCutaneous every 24 hours  lactated ringers. 1000 milliLiter(s) (80 mL/Hr) IV Continuous <Continuous>  levothyroxine 75 MICROGram(s) Oral daily  simvastatin 10 milliGRAM(s) Oral at bedtime  tamsulosin 0.4 milliGRAM(s) Oral at bedtime    MEDICATIONS  (PRN):      Allergies    No Known Allergies    Intolerances        Vital Signs Last 24 Hrs  T(C): 36.7 (16 Oct 2022 10:40), Max: 37.5 (15 Oct 2022 19:53)  T(F): 98 (16 Oct 2022 10:40), Max: 99.5 (15 Oct 2022 19:53)  HR: 81 (16 Oct 2022 10:40) (81 - 102)  BP: 105/66 (16 Oct 2022 10:40) (100/64 - 119/75)  BP(mean): --  RR: 18 (16 Oct 2022 10:40) (17 - 18)  SpO2: 97% (16 Oct 2022 05:10) (94% - 97%)    Parameters below as of 16 Oct 2022 10:40  Patient On (Oxygen Delivery Method): room air        PHYSICAL EXAM:  GENERAL: NAD, well-groomed, well-developed  HEAD:  Atraumatic, Normocephalic  EYES: EOMI, PERRLA, conjunctiva and sclera clear  ENMT: No tonsillar erythema, exudates, or enlargement; Moist mucous membranes, Good dentition, No lesions  NECK: Supple, No JVD, Normal thyroid  NERVOUS SYSTEM:  Alert & Oriented X3, Good concentration; Motor Strength 5/5 B/L upper and lower extremities; DTRs 2+ intact and symmetric  CHEST/LUNG: Clear to auscultation bilaterally; No rales, rhonchi, wheezing, or rubs  HEART: Regular rate and rhythm; No murmurs, rubs, or gallops  ABDOMEN: Soft, Nontender, Nondistended; Bowel sounds present  EXTREMITIES:  2+ Peripheral Pulses, No clubbing, cyanosis, or edema  LYMPH: No lymphadenopathy noted  SKIN: No rashes or lesions    LABS:                        12.3   19.27 )-----------( 202      ( 16 Oct 2022 06:50 )             36.0     10-16    130<L>  |  96  |  15  ----------------------------<  108<H>  3.6   |  26  |  0.98    Ca    8.4<L>      16 Oct 2022 06:50  Phos  1.6     10-16  Mg     2.1     10-16    TPro  6.0  /  Alb  2.3<L>  /  TBili  0.7  /  DBili  x   /  AST  26  /  ALT  13  /  AlkPhos  55  10-16      Urinalysis Basic - ( 15 Oct 2022 05:30 )    Color: Yellow / Appearance: Slightly Turbid / S.020 / pH: x  Gluc: x / Ketone: Trace  / Bili: Negative / Urobili: Negative mg/dL   Blood: x / Protein: 100 mg/dL / Nitrite: Positive   Leuk Esterase: Moderate / RBC: 6-10 /HPF / WBC >50   Sq Epi: x / Non Sq Epi: Occasional / Bacteria: Moderate      CAPILLARY BLOOD GLUCOSE          RADIOLOGY & ADDITIONAL TESTS:    10-15-22 @ 07:01  -  10-16-22 @ 07:00  --------------------------------------------------------  IN:    Lactated Ringers: 160 mL  Total IN: 160 mL    OUT:    Indwelling Catheter - Urethral (mL): 1450 mL  Total OUT: 1450 mL    Total NET: -1290 mL      10-16-22 @ 07:01  -  10-16-22 @ 15:35  --------------------------------------------------------  IN:  Total IN: 0 mL    OUT:    Indwelling Catheter - Urethral (mL): 500 mL  Total OUT: 500 mL    Total NET: -500 mL       83 y/o male from home w/ PMH of HTN, HLD, hypothyroidism and urinary retention s/p Braun p/w fever x2 days and was admitted w/ sepsis POA secondary to UTI and hyponatremia. He is lying in bed in NAD.    MEDICATIONS  (STANDING):  aspirin enteric coated 81 milliGRAM(s) Oral daily  cefTRIAXone   IVPB 1000 milliGRAM(s) IV Intermittent every 24 hours  enoxaparin Injectable 40 milliGRAM(s) SubCutaneous every 24 hours  lactated ringers. 1000 milliLiter(s) (80 mL/Hr) IV Continuous <Continuous>  levothyroxine 75 MICROGram(s) Oral daily  simvastatin 10 milliGRAM(s) Oral at bedtime  tamsulosin 0.4 milliGRAM(s) Oral at bedtime    MEDICATIONS  (PRN):      Allergies    No Known Allergies    Intolerances        Vital Signs Last 24 Hrs  T(C): 36.7 (16 Oct 2022 10:40), Max: 37.5 (15 Oct 2022 19:53)  T(F): 98 (16 Oct 2022 10:40), Max: 99.5 (15 Oct 2022 19:53)  HR: 81 (16 Oct 2022 10:40) (81 - 102)  BP: 105/66 (16 Oct 2022 10:40) (100/64 - 119/75)   RR: 18 (16 Oct 2022 10:40) (17 - 18)  SpO2: 97% (16 Oct 2022 05:10) (94% - 97%)    Parameters below as of 16 Oct 2022 10:40  Patient On (Oxygen Delivery Method): room air        PHYSICAL EXAM:  GENERAL: NAD, well-groomed, well-developed  HEAD:  Atraumatic, Normocephalic  EYES: EOMI, PERRLA   NECK: Supple   NERVOUS SYSTEM:  Alert & Oriented X3, Good concentration   CHEST/LUNG: Clear to auscultation bilaterally; No rales, rhonchi, wheezing, or rubs  HEART: Regular rate and rhythm; No murmurs, rubs, or gallops  ABDOMEN: Soft, Nontender, Nondistended; Bowel sounds present  EXTREMITIES:  No clubbing, cyanosis, or edema     LABS:                        12.3   19.27 )-----------( 202      ( 16 Oct 2022 06:50 )             36.0     10-16    130<L>  |  96  |  15  ----------------------------<  108<H>  3.6   |  26  |  0.98    Ca    8.4<L>      16 Oct 2022 06:50  Phos  1.6     10-16  Mg     2.1     10-16    TPro  6.0  /  Alb  2.3<L>  /  TBili  0.7  /  DBili  x   /  AST  26  /  ALT  13  /  AlkPhos  55  10-16      Urinalysis Basic - ( 15 Oct 2022 05:30 )    Color: Yellow / Appearance: Slightly Turbid / S.020 / pH: x  Gluc: x / Ketone: Trace  / Bili: Negative / Urobili: Negative mg/dL   Blood: x / Protein: 100 mg/dL / Nitrite: Positive   Leuk Esterase: Moderate / RBC: 6-10 /HPF / WBC >50   Sq Epi: x / Non Sq Epi: Occasional / Bacteria: Moderate      CAPILLARY BLOOD GLUCOSE          RADIOLOGY & ADDITIONAL TESTS:    10-15-22 @ 07:01  -  10-16-22 @ 07:00  --------------------------------------------------------  IN:    Lactated Ringers: 160 mL  Total IN: 160 mL    OUT:    Indwelling Catheter - Urethral (mL): 1450 mL  Total OUT: 1450 mL    Total NET: -1290 mL      10-16-22 @ 07:01  -  10-16-22 @ 15:35  --------------------------------------------------------  IN:  Total IN: 0 mL    OUT:    Indwelling Catheter - Urethral (mL): 500 mL  Total OUT: 500 mL    Total NET: -500 mL

## 2022-10-17 LAB
ANION GAP SERPL CALC-SCNC: 9 MMOL/L — SIGNIFICANT CHANGE UP (ref 5–17)
BASOPHILS # BLD AUTO: 0.03 K/UL — SIGNIFICANT CHANGE UP (ref 0–0.2)
BASOPHILS NFR BLD AUTO: 0.3 % — SIGNIFICANT CHANGE UP (ref 0–2)
BUN SERPL-MCNC: 14 MG/DL — SIGNIFICANT CHANGE UP (ref 7–23)
CALCIUM SERPL-MCNC: 8.2 MG/DL — LOW (ref 8.5–10.1)
CHLORIDE SERPL-SCNC: 98 MMOL/L — SIGNIFICANT CHANGE UP (ref 96–108)
CO2 SERPL-SCNC: 25 MMOL/L — SIGNIFICANT CHANGE UP (ref 22–31)
CREAT SERPL-MCNC: 0.78 MG/DL — SIGNIFICANT CHANGE UP (ref 0.5–1.3)
EGFR: 90 ML/MIN/1.73M2 — SIGNIFICANT CHANGE UP
EOSINOPHIL # BLD AUTO: 0.15 K/UL — SIGNIFICANT CHANGE UP (ref 0–0.5)
EOSINOPHIL NFR BLD AUTO: 1.5 % — SIGNIFICANT CHANGE UP (ref 0–6)
GLUCOSE SERPL-MCNC: 94 MG/DL — SIGNIFICANT CHANGE UP (ref 70–99)
HCT VFR BLD CALC: 33.6 % — LOW (ref 39–50)
HGB BLD-MCNC: 11.5 G/DL — LOW (ref 13–17)
IMM GRANULOCYTES NFR BLD AUTO: 0.5 % — SIGNIFICANT CHANGE UP (ref 0–0.9)
LYMPHOCYTES # BLD AUTO: 1.5 K/UL — SIGNIFICANT CHANGE UP (ref 1–3.3)
LYMPHOCYTES # BLD AUTO: 14.6 % — SIGNIFICANT CHANGE UP (ref 13–44)
MAGNESIUM SERPL-MCNC: 2 MG/DL — SIGNIFICANT CHANGE UP (ref 1.6–2.6)
MCHC RBC-ENTMCNC: 32.4 PG — SIGNIFICANT CHANGE UP (ref 27–34)
MCHC RBC-ENTMCNC: 34.2 G/DL — SIGNIFICANT CHANGE UP (ref 32–36)
MCV RBC AUTO: 94.6 FL — SIGNIFICANT CHANGE UP (ref 80–100)
MONOCYTES # BLD AUTO: 0.87 K/UL — SIGNIFICANT CHANGE UP (ref 0–0.9)
MONOCYTES NFR BLD AUTO: 8.5 % — SIGNIFICANT CHANGE UP (ref 2–14)
NEUTROPHILS # BLD AUTO: 7.69 K/UL — HIGH (ref 1.8–7.4)
NEUTROPHILS NFR BLD AUTO: 74.6 % — SIGNIFICANT CHANGE UP (ref 43–77)
NRBC # BLD: 0 /100 WBCS — SIGNIFICANT CHANGE UP (ref 0–0)
PHOSPHATE SERPL-MCNC: 2.2 MG/DL — LOW (ref 2.5–4.5)
PLATELET # BLD AUTO: 195 K/UL — SIGNIFICANT CHANGE UP (ref 150–400)
POTASSIUM SERPL-MCNC: 3.6 MMOL/L — SIGNIFICANT CHANGE UP (ref 3.5–5.3)
POTASSIUM SERPL-SCNC: 3.6 MMOL/L — SIGNIFICANT CHANGE UP (ref 3.5–5.3)
RBC # BLD: 3.55 M/UL — LOW (ref 4.2–5.8)
RBC # FLD: 12.8 % — SIGNIFICANT CHANGE UP (ref 10.3–14.5)
SODIUM SERPL-SCNC: 132 MMOL/L — LOW (ref 135–145)
WBC # BLD: 10.29 K/UL — SIGNIFICANT CHANGE UP (ref 3.8–10.5)
WBC # FLD AUTO: 10.29 K/UL — SIGNIFICANT CHANGE UP (ref 3.8–10.5)

## 2022-10-17 PROCEDURE — 99232 SBSQ HOSP IP/OBS MODERATE 35: CPT

## 2022-10-17 RX ADMIN — SODIUM CHLORIDE 60 MILLILITER(S): 9 INJECTION INTRAMUSCULAR; INTRAVENOUS; SUBCUTANEOUS at 05:35

## 2022-10-17 RX ADMIN — ENOXAPARIN SODIUM 40 MILLIGRAM(S): 100 INJECTION SUBCUTANEOUS at 17:29

## 2022-10-17 RX ADMIN — SODIUM CHLORIDE 60 MILLILITER(S): 9 INJECTION INTRAMUSCULAR; INTRAVENOUS; SUBCUTANEOUS at 08:01

## 2022-10-17 RX ADMIN — SIMVASTATIN 10 MILLIGRAM(S): 20 TABLET, FILM COATED ORAL at 21:46

## 2022-10-17 RX ADMIN — CEFTRIAXONE 100 MILLIGRAM(S): 500 INJECTION, POWDER, FOR SOLUTION INTRAMUSCULAR; INTRAVENOUS at 05:35

## 2022-10-17 RX ADMIN — TAMSULOSIN HYDROCHLORIDE 0.4 MILLIGRAM(S): 0.4 CAPSULE ORAL at 21:45

## 2022-10-17 RX ADMIN — Medication 75 MICROGRAM(S): at 05:35

## 2022-10-17 RX ADMIN — Medication 2 PACKET(S): at 23:15

## 2022-10-17 RX ADMIN — Medication 2 PACKET(S): at 05:35

## 2022-10-17 RX ADMIN — SODIUM CHLORIDE 60 MILLILITER(S): 9 INJECTION INTRAMUSCULAR; INTRAVENOUS; SUBCUTANEOUS at 00:04

## 2022-10-17 RX ADMIN — Medication 2 PACKET(S): at 17:29

## 2022-10-17 RX ADMIN — Medication 2 PACKET(S): at 11:58

## 2022-10-17 RX ADMIN — Medication 2 PACKET(S): at 00:05

## 2022-10-17 NOTE — PROGRESS NOTE ADULT - SUBJECTIVE AND OBJECTIVE BOX
83 y/o male from home w/ PMH of HTN, HLD, hypothyroidism and urinary retention s/p Braun p/w fever x2 days and was admitted w/ sepsis POA secondary to UTI and hyponatremia. He is lying in bed in NAD.     MEDICATIONS  (STANDING):  cefTRIAXone   IVPB 1000 milliGRAM(s) IV Intermittent every 24 hours  enoxaparin Injectable 40 milliGRAM(s) SubCutaneous every 24 hours  levothyroxine 75 MICROGram(s) Oral daily  potassium phosphate / sodium phosphate Powder (PHOS-NaK) 2 Packet(s) Oral four times a day  simvastatin 10 milliGRAM(s) Oral at bedtime  sodium chloride 0.9%. 1000 milliLiter(s) (60 mL/Hr) IV Continuous <Continuous>  tamsulosin 0.4 milliGRAM(s) Oral at bedtime    MEDICATIONS  (PRN):      Allergies    No Known Allergies    Intolerances        Vital Signs Last 24 Hrs  T(C): 36.9 (17 Oct 2022 17:03), Max: 36.9 (17 Oct 2022 11:40)  T(F): 98.5 (17 Oct 2022 17:03), Max: 98.5 (17 Oct 2022 17:03)  HR: 71 (17 Oct 2022 17:03) (71 - 102)  BP: 148/78 (17 Oct 2022 17:03) (115/74 - 148/78)   RR: 18 (17 Oct 2022 17:03) (17 - 18)  SpO2: 95% (17 Oct 2022 17:03) (94% - 95%)    Parameters below as of 17 Oct 2022 17:03  Patient On (Oxygen Delivery Method): room air        PHYSICAL EXAM:  GENERAL: NAD, well-groomed, well-developed  HEAD:  Atraumatic, Normocephalic  EYES: EOMI, PERRLA   NECK: Supple   NERVOUS SYSTEM:  Alert & Oriented X3, Good concentration   CHEST/LUNG: Clear to auscultation bilaterally; No rales, rhonchi, wheezing, or rubs  HEART: Regular rate and rhythm; No murmurs, rubs, or gallops  ABDOMEN: Soft, Nontender, Nondistended; Bowel sounds present  EXTREMITIES:  No clubbing, cyanosis, or edema      LABS:                        11.5   10.29 )-----------( 195      ( 17 Oct 2022 08:09 )             33.6     10-17    132<L>  |  98  |  14  ----------------------------<  94  3.6   |  25  |  0.78    Ca    8.2<L>      17 Oct 2022 08:09  Phos  2.2     10-17  Mg     2.0     10-17    TPro  6.0  /  Alb  2.3<L>  /  TBili  0.7  /  DBili  x   /  AST  26  /  ALT  13  /  AlkPhos  55  10-16        CAPILLARY BLOOD GLUCOSE          Culture - Urine (collected 15 Oct 2022 05:30)  Source: Catheterized Catheterized  Preliminary Report (17 Oct 2022 14:11):    >100,000 CFU/ml Klebsiella variicola      RADIOLOGY & ADDITIONAL TESTS:    10-16-22 @ 07:01  -  10-17-22 @ 07:00  --------------------------------------------------------  IN:  Total IN: 0 mL    OUT:    Indwelling Catheter - Urethral (mL): 2000 mL  Total OUT: 2000 mL    Total NET: -2000 mL      10-17-22 @ 07:01  -  10-17-22 @ 20:00  --------------------------------------------------------  IN:  Total IN: 0 mL    OUT:    Indwelling Catheter - Urethral (mL): 900 mL  Total OUT: 900 mL    Total NET: -900 mL

## 2022-10-17 NOTE — PROGRESS NOTE ADULT - ASSESSMENT
83 y/o male from home w/ PMH of HTN, HLD, hypothyroidism and urinary retention s/p Braun p/w fever x2 days and was admitted w/ sepsis POA secondary to UTI and hyponatremia.     Complicated UTI  - UA grossly positive w/ + nitrite, LE and WBC   - s/p IVF Bolus  - c/w Rocephin   - urine cx growing klebsiella      Peripheral Eosinophilia  - not present on repeat cbc w/ diff, suspect labs error     Hyponatremia  - likely due to hypovolemia  - change IVF from LR to NS    hypophosphatemia  - replace w/ Phos    Hx of Etoh use  - will watch for Etoh withdrawal     HTN  - hold home quinapril and HCTZ as BP is borderline     Hypothyroidism   - c/w levothyroxine     HLD  - c/w Simvastatin     Urinary retention  - Braun cathter placed at urologist's office 9 days ago for urinary retention and is scheduled to get TURP  - c/w Braun cathter, advised RN to exchange it   - Urology f/u   - c/w Flomax    Prophylaxis:  DVT: Lovenox   GI: PO diet 83 y/o male from home w/ PMH of HTN, HLD, hypothyroidism and urinary retention s/p Braun p/w fever x2 days and was admitted w/ sepsis POA secondary to UTI and hyponatremia.     Sepsis secondary to catheter associated UTI  - UA grossly positive w/ + nitrite, LE and WBC   - s/p IVF Bolus  - c/w Rocephin   - urine cx growing klebsiella      Peripheral Eosinophilia  - not present on repeat cbc w/ diff, suspect labs error     Hyponatremia  - likely due to hypovolemia  - change IVF from LR to NS    hypophosphatemia  - replace w/ Phos    Hx of Etoh use  - will watch for Etoh withdrawal     HTN  - hold home quinapril and HCTZ as BP is borderline     Hypothyroidism   - c/w levothyroxine     HLD  - c/w Simvastatin     Urinary retention  - Braun cathter placed at urologist's office 9 days ago for urinary retention and is scheduled to get TURP  - c/w Braun cathter, advised RN to exchange it   - Urology f/u   - c/w Flomax    Prophylaxis:  DVT: Lovenox   GI: PO diet

## 2022-10-18 ENCOUNTER — TRANSCRIPTION ENCOUNTER (OUTPATIENT)
Age: 82
End: 2022-10-18

## 2022-10-18 VITALS
HEART RATE: 79 BPM | DIASTOLIC BLOOD PRESSURE: 89 MMHG | SYSTOLIC BLOOD PRESSURE: 146 MMHG | TEMPERATURE: 98 F | OXYGEN SATURATION: 95 % | RESPIRATION RATE: 18 BRPM

## 2022-10-18 LAB
-  AMIKACIN: SIGNIFICANT CHANGE UP
-  AMOXICILLIN/CLAVULANIC ACID: SIGNIFICANT CHANGE UP
-  AMPICILLIN/SULBACTAM: SIGNIFICANT CHANGE UP
-  AMPICILLIN: SIGNIFICANT CHANGE UP
-  AZTREONAM: SIGNIFICANT CHANGE UP
-  CEFAZOLIN: SIGNIFICANT CHANGE UP
-  CEFEPIME: SIGNIFICANT CHANGE UP
-  CEFOXITIN: SIGNIFICANT CHANGE UP
-  CEFTRIAXONE: SIGNIFICANT CHANGE UP
-  CIPROFLOXACIN: SIGNIFICANT CHANGE UP
-  ERTAPENEM: SIGNIFICANT CHANGE UP
-  GENTAMICIN: SIGNIFICANT CHANGE UP
-  IMIPENEM: SIGNIFICANT CHANGE UP
-  LEVOFLOXACIN: SIGNIFICANT CHANGE UP
-  MEROPENEM: SIGNIFICANT CHANGE UP
-  NITROFURANTOIN: SIGNIFICANT CHANGE UP
-  PIPERACILLIN/TAZOBACTAM: SIGNIFICANT CHANGE UP
-  TIGECYCLINE: SIGNIFICANT CHANGE UP
-  TOBRAMYCIN: SIGNIFICANT CHANGE UP
-  TRIMETHOPRIM/SULFAMETHOXAZOLE: SIGNIFICANT CHANGE UP
ANION GAP SERPL CALC-SCNC: 7 MMOL/L — SIGNIFICANT CHANGE UP (ref 5–17)
BUN SERPL-MCNC: 13 MG/DL — SIGNIFICANT CHANGE UP (ref 7–23)
CALCIUM SERPL-MCNC: 8.4 MG/DL — LOW (ref 8.5–10.1)
CHLORIDE SERPL-SCNC: 100 MMOL/L — SIGNIFICANT CHANGE UP (ref 96–108)
CO2 SERPL-SCNC: 26 MMOL/L — SIGNIFICANT CHANGE UP (ref 22–31)
CREAT SERPL-MCNC: 0.86 MG/DL — SIGNIFICANT CHANGE UP (ref 0.5–1.3)
EGFR: 88 ML/MIN/1.73M2 — SIGNIFICANT CHANGE UP
GLUCOSE SERPL-MCNC: 97 MG/DL — SIGNIFICANT CHANGE UP (ref 70–99)
HCT VFR BLD CALC: 34.5 % — LOW (ref 39–50)
HGB BLD-MCNC: 11.5 G/DL — LOW (ref 13–17)
IGE SERPL-ACNC: 30 KU/L — SIGNIFICANT CHANGE UP
MAGNESIUM SERPL-MCNC: 2.1 MG/DL — SIGNIFICANT CHANGE UP (ref 1.6–2.6)
MCHC RBC-ENTMCNC: 31.8 PG — SIGNIFICANT CHANGE UP (ref 27–34)
MCHC RBC-ENTMCNC: 33.3 G/DL — SIGNIFICANT CHANGE UP (ref 32–36)
MCV RBC AUTO: 95.3 FL — SIGNIFICANT CHANGE UP (ref 80–100)
METHOD TYPE: SIGNIFICANT CHANGE UP
NRBC # BLD: 0 /100 WBCS — SIGNIFICANT CHANGE UP (ref 0–0)
PHOSPHATE SERPL-MCNC: 2.6 MG/DL — SIGNIFICANT CHANGE UP (ref 2.5–4.5)
PLATELET # BLD AUTO: 207 K/UL — SIGNIFICANT CHANGE UP (ref 150–400)
POTASSIUM SERPL-MCNC: 3.8 MMOL/L — SIGNIFICANT CHANGE UP (ref 3.5–5.3)
POTASSIUM SERPL-SCNC: 3.8 MMOL/L — SIGNIFICANT CHANGE UP (ref 3.5–5.3)
RBC # BLD: 3.62 M/UL — LOW (ref 4.2–5.8)
RBC # FLD: 12.8 % — SIGNIFICANT CHANGE UP (ref 10.3–14.5)
SODIUM SERPL-SCNC: 133 MMOL/L — LOW (ref 135–145)
WBC # BLD: 8.25 K/UL — SIGNIFICANT CHANGE UP (ref 3.8–10.5)
WBC # FLD AUTO: 8.25 K/UL — SIGNIFICANT CHANGE UP (ref 3.8–10.5)

## 2022-10-18 PROCEDURE — 99239 HOSP IP/OBS DSCHRG MGMT >30: CPT

## 2022-10-18 RX ORDER — TAMSULOSIN HYDROCHLORIDE 0.4 MG/1
1 CAPSULE ORAL
Qty: 30 | Refills: 0
Start: 2022-10-18 | End: 2022-11-16

## 2022-10-18 RX ORDER — CEFPODOXIME PROXETIL 100 MG
1 TABLET ORAL
Qty: 6 | Refills: 0
Start: 2022-10-18 | End: 2022-10-20

## 2022-10-18 RX ADMIN — SODIUM CHLORIDE 60 MILLILITER(S): 9 INJECTION INTRAMUSCULAR; INTRAVENOUS; SUBCUTANEOUS at 04:36

## 2022-10-18 RX ADMIN — CEFTRIAXONE 100 MILLIGRAM(S): 500 INJECTION, POWDER, FOR SOLUTION INTRAMUSCULAR; INTRAVENOUS at 04:36

## 2022-10-18 RX ADMIN — Medication 75 MICROGRAM(S): at 05:10

## 2022-10-18 RX ADMIN — Medication 2 PACKET(S): at 05:10

## 2022-10-18 NOTE — DISCHARGE NOTE PROVIDER - NSDCFUADDINST_GEN_ALL_CORE_FT
In the hospital your blood pressure was very stable.   Please measure your blood pressure at home-- and if at normal range ~130/80 or less, do not take blood pressure medications.

## 2022-10-18 NOTE — DISCHARGE NOTE PROVIDER - NSDCFUSCHEDAPPT_GEN_ALL_CORE_FT
Kaela Bell  [facility]  Scheduled Appointment: 10/21/2022    Kaela Bell Physician [facility]  UROLOGY [address]  Scheduled Appointment: 10/24/2022    [facility]ael[facility] Bell  [facility]  Scheduled Appointment: 10/24/2022

## 2022-10-18 NOTE — DISCHARGE NOTE NURSING/CASE MANAGEMENT/SOCIAL WORK - NSDCPEFALRISK_GEN_ALL_CORE
For information on Fall & Injury Prevention, visit: [email or web address] OR  [email or web address] OR  https://www.cdc.gov/steadi/patient.html

## 2022-10-18 NOTE — DISCHARGE NOTE PROVIDER - NSDCMRMEDTOKEN_GEN_ALL_CORE_FT
aspirin 81 mg oral tablet: 1 tab(s) orally once a day  Calcium 500+D oral tablet, chewable: 1 tab(s) orally 2 times a day  cefpodoxime 200 mg oral tablet: 1 tab(s) orally 2 times a day   hydroCHLOROthiazide 25 mg oral tablet: 1 tab(s) orally once a day  levothyroxine 75 mcg (0.075 mg) oral tablet: 1 tab(s) orally once a day  quinapril 20 mg oral tablet: 1 tab(s) orally 2 times a day  simvastatin 10 mg oral tablet: 1 tab(s) orally once a day (at bedtime)  tamsulosin 0.4 mg oral capsule: 1 cap(s) orally once a day (at bedtime)

## 2022-10-18 NOTE — DISCHARGE NOTE PROVIDER - ATTENDING DISCHARGE PHYSICAL EXAMINATION:
PHYSICAL EXAM:  GENERAL: NAD, well-groomed, well-developed  HEAD:  Atraumatic, Normocephalic  EYES: EOMI, PERRLA   NECK: Supple   NERVOUS SYSTEM:  Alert & Oriented X3, Good concentration   CHEST/LUNG: Clear to auscultation bilaterally; No rales, rhonchi, wheezing, or rubs  HEART: Regular rate and rhythm; No murmurs, rubs, or gallops  ABDOMEN: Soft, Nontender, Nondistended; Bowel sounds present  EXTREMITIES:  No clubbing, cyanosis, or edema  Braun: draining yellow urine

## 2022-10-18 NOTE — PROGRESS NOTE ADULT - SUBJECTIVE AND OBJECTIVE BOX
81 y/o male from home w/ PMH of HTN, HLD, hypothyroidism and urinary retention s/p Braun p/w fever x2 days and was admitted w/ sepsis POA secondary to UTI and hyponatremia. He is lying in bed in NAD.     MEDICATIONS  (STANDING):  cefTRIAXone   IVPB 1000 milliGRAM(s) IV Intermittent every 24 hours  enoxaparin Injectable 40 milliGRAM(s) SubCutaneous every 24 hours  levothyroxine 75 MICROGram(s) Oral daily  potassium phosphate / sodium phosphate Powder (PHOS-NaK) 2 Packet(s) Oral four times a day  simvastatin 10 milliGRAM(s) Oral at bedtime  sodium chloride 0.9%. 1000 milliLiter(s) (60 mL/Hr) IV Continuous <Continuous>  tamsulosin 0.4 milliGRAM(s) Oral at bedtime    MEDICATIONS  (PRN):      Allergies    No Known Allergies    Intolerances        Vital Signs Last 24 Hrs  ICU Vital Signs Last 24 Hrs  T(C): 36.8 (18 Oct 2022 11:21), Max: 37.3 (17 Oct 2022 23:36)  T(F): 98.2 (18 Oct 2022 11:21), Max: 99.2 (17 Oct 2022 23:36)  HR: 84 (18 Oct 2022 11:21) (71 - 84)  BP: 135/89 (18 Oct 2022 11:21) (135/89 - 149/85)  BP(mean): --  ABP: --  ABP(mean): --  RR: 17 (18 Oct 2022 11:21) (17 - 18)  SpO2: 94% (18 Oct 2022 11:21) (93% - 95%)    O2 Parameters below as of 18 Oct 2022 11:21  Patient On (Oxygen Delivery Method): room air                PHYSICAL EXAM:  GENERAL: NAD, well-groomed, well-developed                        11.5   8.25  )-----------( 207      ( 18 Oct 2022 05:55 )             34.5     10-18    133<L>  |  100  |  13  ----------------------------<  97  3.8   |  26  |  0.86    Ca    8.4<L>      18 Oct 2022 05:55  Phos  2.6     10-18  Mg     2.1     10-18          HEAD:  Atraumatic, Normocephalic  EYES: EOMI, PERRLA   NECK: Supple   NERVOUS SYSTEM:  Alert & Oriented X3, Good concentration   CHEST/LUNG: Clear to auscultation bilaterally; No rales, rhonchi, wheezing, or rubs  HEART: Regular rate and rhythm; No murmurs, rubs, or gallops  ABDOMEN: Soft, Nontender, Nondistended; Bowel sounds present  EXTREMITIES:  No clubbing, cyanosis, or edema      LABS:                       81 y/o male from home w/ PMH of HTN, HLD, hypothyroidism and urinary retention s/p Cleaning p/w fever x2 days and was admitted w/ sepsis POA secondary to UTI and hyponatremia. He is lying in bed in NAD.     MEDICATIONS  (STANDING):  cefTRIAXone   IVPB 1000 milliGRAM(s) IV Intermittent every 24 hours  enoxaparin Injectable 40 milliGRAM(s) SubCutaneous every 24 hours  levothyroxine 75 MICROGram(s) Oral daily  potassium phosphate / sodium phosphate Powder (PHOS-NaK) 2 Packet(s) Oral four times a day  simvastatin 10 milliGRAM(s) Oral at bedtime  sodium chloride 0.9%. 1000 milliLiter(s) (60 mL/Hr) IV Continuous <Continuous>  tamsulosin 0.4 milliGRAM(s) Oral at bedtime    MEDICATIONS  (PRN):      Allergies    No Known Allergies    Intolerances        Vital Signs Last 24 Hrs  ICU Vital Signs Last 24 Hrs  T(C): 36.8 (18 Oct 2022 11:21), Max: 37.3 (17 Oct 2022 23:36)  T(F): 98.2 (18 Oct 2022 11:21), Max: 99.2 (17 Oct 2022 23:36)  HR: 84 (18 Oct 2022 11:21) (71 - 84)  BP: 135/89 (18 Oct 2022 11:21) (135/89 - 149/85)  BP(mean): --  ABP: --  ABP(mean): --  RR: 17 (18 Oct 2022 11:21) (17 - 18)  SpO2: 94% (18 Oct 2022 11:21) (93% - 95%)    O2 Parameters below as of 18 Oct 2022 11:21  Patient On (Oxygen Delivery Method): room air      PE  GENERAL: NAD, well-groomed, well-developed  HEAD:  Atraumatic, Normocephalic  EYES: EOMI, PERRLA   NECK: Supple   NERVOUS SYSTEM:  Alert & Oriented X3, Good concentration   CHEST/LUNG: Clear to auscultation bilaterally; No rales, rhonchi, wheezing, or rubs  HEART: Regular rate and rhythm; No murmurs, rubs, or gallops  ABDOMEN: Soft, Nontender, Nondistended; Bowel sounds present, cleaning-   EXTREMITIES:  No clubbing, cyanosis, or edema                          11.5   8.25  )-----------( 207      ( 18 Oct 2022 05:55 )             34.5     10-18    133<L>  |  100  |  13  ----------------------------<  97  3.8   |  26  |  0.86    Ca    8.4<L>      18 Oct 2022 05:55  Phos  2.6     10-18  Mg     2.1     10-18

## 2022-10-18 NOTE — DISCHARGE NOTE NURSING/CASE MANAGEMENT/SOCIAL WORK - PATIENT PORTAL LINK FT
You can access the FollowMyHealth Patient Portal offered by [facility] by registering at the following website: [email or web address]. By joining [facility]’s FollowMyHealth portal, you will also be able to view your health information using other applications (apps) compatible with our system.

## 2022-10-18 NOTE — PROGRESS NOTE ADULT - ASSESSMENT
81 y/o male from home w/ PMH of HTN, HLD, hypothyroidism and urinary retention s/p Braun p/w fever x2 days and was admitted w/ sepsis POA secondary to UTI and hyponatremia.     Sepsis secondary to catheter associated UTI  WBC wnl, vitals stable   - UA grossly positive w/ + nitrite, LE and WBC   - s/p IVF Bolus  - c/w Rocephin Day#4  - urine cx growing klebsiella  -sensitive to Rocephin     Peripheral Eosinophilia  - not present on repeat cbc w/ diff, suspect labs error   -    Hyponatremia  - likely due to hypovolemia  - change IVF from LR to NS    hypophosphatemia  - replace w/ Phos    Hx of Etoh use  - will watch for Etoh withdrawal     HTN  - hold home quinapril and HCTZ as BP is borderline     Hypothyroidism   - c/w levothyroxine     HLD  - c/w Simvastatin     Urinary retention  - Braun cathter placed at urologist's office 9 days ago for urinary retention and is scheduled to get TURP  - c/w Braun cathter, advised RN to exchange it   - Urology f/u   - c/w Flomax    Prophylaxis:  DVT: Lovenox   GI: PO diet 81 y/o male from home w/ PMH of HTN, HLD, hypothyroidism and urinary retention s/p Braun p/w fever x2 days and was admitted w/ sepsis POA secondary to UTI and hyponatremia.     Sepsis secondary to catheter associated UTI  WBC wnl, vitals stable   - UA grossly positive w/ + nitrite, LE and WBC     c/w Rocephin Day#4  - urine cx growing klebsiella  -sensitive to Rocephin     Peripheral Eosinophilia  - not present on repeat cbc w/ diff, suspect labs error       Hyponatremia  - likely due to hypovolemia  - change IVF from LR to NS    hypophosphatemia  - replace w/ Phos    Hx of Etoh use  -no Etoh withdrawal symptoms noted    HTN  - hold home quinapril and HCTZ as BP is borderline     Hypothyroidism   - c/w levothyroxine     HLD  - c/w Simvastatin     Urinary retention  - Braun cathter placed at urologist's office 9 days ago for urinary retention and is scheduled to get TURP  - c/w Braun cathter---will speak with pt's Urologist first- awaiting call- Dr. Jassi Bell-[phone]  - Urology f/u   - c/w Flomax    Prophylaxis:  DVT: Lovenox   GI: PO diet

## 2022-10-18 NOTE — DISCHARGE NOTE PROVIDER - HOSPITAL COURSE
83 y/o male from home w/ PMH of HTN, HLD, hypothyroidism and urinary retention s/p Cleaning p/w fever x2 days and was admitted w/ sepsis POA secondary to UTI and hyponatremia.     Sepsis secondary to catheter associated UTI  WBC wnl, vitals stable   - UA grossly positive w/ + nitrite, LE and WBC     s/p Rocephin Day#4---will switch to Cefpodoxime for 3 more days.   - urine cx growing klebsiella  -sensitive to Rocephin     Peripheral Eosinophilia  - not present on repeat cbc w/ diff, suspect labs error       Hyponatremia  - likely due to hypovolemia  - change IVF from LR to NS    hypophosphatemia  - replace w/ Phos    Hx of Etoh use  -no Etoh withdrawal symptoms noted    HTN  - hold home quinapril and HCTZ as BP is borderline     Hypothyroidism   - c/w levothyroxine     HLD  - c/w Simvastatin     Urinary retention  - Cleaning cathter placed at urologist's office 9 days ago for urinary retention and is scheduled to get TURP on 10/24.   I spoke with pt's Urologist today- Dr. Jassi Bell- who stated that there is no need to replace cleaning now since pt will have procedure in few days and cleaning will need to come out anyway. Agrees to continue antibiotics   Dr. Jassi Bell-[phone]      Patient is clinically and hemodynamically stable for discharge. Patient will follow up outpatient with his Urologist- Dr. Bell

## 2022-10-19 LAB
-  AMIKACIN: SIGNIFICANT CHANGE UP
-  AMPICILLIN/SULBACTAM: SIGNIFICANT CHANGE UP
-  CEFEPIME: SIGNIFICANT CHANGE UP
-  CEFTAZIDIME: SIGNIFICANT CHANGE UP
-  CEFTRIAXONE: SIGNIFICANT CHANGE UP
-  CIPROFLOXACIN: SIGNIFICANT CHANGE UP
-  GENTAMICIN: SIGNIFICANT CHANGE UP
-  IMIPENEM: SIGNIFICANT CHANGE UP
-  LEVOFLOXACIN: SIGNIFICANT CHANGE UP
-  MEROPENEM: SIGNIFICANT CHANGE UP
-  PIPERACILLIN/TAZOBACTAM: SIGNIFICANT CHANGE UP
-  TOBRAMYCIN: SIGNIFICANT CHANGE UP
-  TRIMETHOPRIM/SULFAMETHOXAZOLE: SIGNIFICANT CHANGE UP
CULTURE RESULTS: SIGNIFICANT CHANGE UP
METHOD TYPE: SIGNIFICANT CHANGE UP
METHOD TYPE: SIGNIFICANT CHANGE UP
ORGANISM # SPEC MICROSCOPIC CNT: SIGNIFICANT CHANGE UP
SPECIMEN SOURCE: SIGNIFICANT CHANGE UP

## 2022-10-20 ENCOUNTER — NON-APPOINTMENT (OUTPATIENT)
Age: 82
End: 2022-10-20

## 2022-10-21 ENCOUNTER — OUTPATIENT (OUTPATIENT)
Dept: OUTPATIENT SERVICES | Facility: HOSPITAL | Age: 82
LOS: 1 days | End: 2022-10-21
Payer: MEDICARE

## 2022-10-21 DIAGNOSIS — Z11.52 ENCOUNTER FOR SCREENING FOR COVID-19: ICD-10-CM

## 2022-10-21 DIAGNOSIS — Z98.890 OTHER SPECIFIED POSTPROCEDURAL STATES: Chronic | ICD-10-CM

## 2022-10-21 DIAGNOSIS — Z90.49 ACQUIRED ABSENCE OF OTHER SPECIFIED PARTS OF DIGESTIVE TRACT: Chronic | ICD-10-CM

## 2022-10-21 LAB — SARS-COV-2 RNA SPEC QL NAA+PROBE: SIGNIFICANT CHANGE UP

## 2022-10-21 PROCEDURE — U0005: CPT

## 2022-10-21 PROCEDURE — U0003: CPT

## 2022-10-21 PROCEDURE — C9803: CPT

## 2022-10-23 ENCOUNTER — TRANSCRIPTION ENCOUNTER (OUTPATIENT)
Age: 82
End: 2022-10-23

## 2022-10-24 ENCOUNTER — APPOINTMENT (OUTPATIENT)
Dept: UROLOGY | Facility: HOSPITAL | Age: 82
End: 2022-10-24

## 2022-10-24 ENCOUNTER — OUTPATIENT (OUTPATIENT)
Dept: INPATIENT UNIT | Facility: HOSPITAL | Age: 82
LOS: 1 days | End: 2022-10-24
Payer: MEDICARE

## 2022-10-24 ENCOUNTER — TRANSCRIPTION ENCOUNTER (OUTPATIENT)
Age: 82
End: 2022-10-24

## 2022-10-24 ENCOUNTER — NON-APPOINTMENT (OUTPATIENT)
Age: 82
End: 2022-10-24

## 2022-10-24 VITALS
HEIGHT: 72 IN | TEMPERATURE: 98 F | WEIGHT: 192.9 LBS | HEART RATE: 63 BPM | RESPIRATION RATE: 18 BRPM | OXYGEN SATURATION: 97 %

## 2022-10-24 VITALS — RESPIRATION RATE: 16 BRPM | TEMPERATURE: 98 F | OXYGEN SATURATION: 99 % | HEART RATE: 78 BPM

## 2022-10-24 DIAGNOSIS — N39.0 URINARY TRACT INFECTION, SITE NOT SPECIFIED: ICD-10-CM

## 2022-10-24 DIAGNOSIS — E03.9 HYPOTHYROIDISM, UNSPECIFIED: ICD-10-CM
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
## 2022-10-24 DIAGNOSIS — R33.9 RETENTION OF URINE, UNSPECIFIED: ICD-10-CM

## 2022-10-24 DIAGNOSIS — E87.1 HYPO-OSMOLALITY AND HYPONATREMIA: ICD-10-CM
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
## 2022-10-24 DIAGNOSIS — I10 ESSENTIAL (PRIMARY) HYPERTENSION: ICD-10-CM

## 2022-10-24 DIAGNOSIS — T83.511A INFECTION AND INFLAMMATORY REACTION DUE TO INDWELLING URETHRAL CATHETER, INITIAL ENCOUNTER: ICD-10-CM

## 2022-10-24 DIAGNOSIS — Z96.0 PRESENCE OF UROGENITAL IMPLANTS: ICD-10-CM

## 2022-10-24 DIAGNOSIS — E78.5 HYPERLIPIDEMIA, UNSPECIFIED: ICD-10-CM

## 2022-10-24 DIAGNOSIS — Z90.49 ACQUIRED ABSENCE OF OTHER SPECIFIED PARTS OF DIGESTIVE TRACT: Chronic | ICD-10-CM

## 2022-10-24 DIAGNOSIS — Z79.890 HORMONE REPLACEMENT THERAPY: ICD-10-CM

## 2022-10-24 DIAGNOSIS — A41.9 SEPSIS, UNSPECIFIED ORGANISM: ICD-10-CM

## 2022-10-24 DIAGNOSIS — E86.1 HYPOVOLEMIA: ICD-10-CM

## 2022-10-24 DIAGNOSIS — Z98.890 OTHER SPECIFIED POSTPROCEDURAL STATES: Chronic | ICD-10-CM

## 2022-10-24 DIAGNOSIS — B96.1 KLEBSIELLA PNEUMONIAE [K. PNEUMONIAE] AS THE CAUSE OF DISEASES CLASSIFIED ELSEWHERE: ICD-10-CM

## 2022-10-24 DIAGNOSIS — Z79.82 LONG TERM (CURRENT) USE OF ASPIRIN: ICD-10-CM

## 2022-10-24 DIAGNOSIS — E83.39 OTHER DISORDERS OF PHOSPHORUS METABOLISM: ICD-10-CM

## 2022-10-24 DIAGNOSIS — Z87.891 PERSONAL HISTORY OF NICOTINE DEPENDENCE: ICD-10-CM

## 2022-10-24 PROCEDURE — 52601 PROSTATECTOMY (TURP): CPT

## 2022-10-24 PROCEDURE — ZZZZZ: CPT

## 2022-10-24 PROCEDURE — 87186 SC STD MICRODIL/AGAR DIL: CPT

## 2022-10-24 PROCEDURE — 87184 SC STD DISK METHOD PER PLATE: CPT

## 2022-10-24 PROCEDURE — C9399: CPT

## 2022-10-24 PROCEDURE — 87086 URINE CULTURE/COLONY COUNT: CPT

## 2022-10-24 PROCEDURE — 87077 CULTURE AEROBIC IDENTIFY: CPT

## 2022-10-24 RX ORDER — HYDROMORPHONE HYDROCHLORIDE 2 MG/ML
0.25 INJECTION INTRAMUSCULAR; INTRAVENOUS; SUBCUTANEOUS
Refills: 0 | Status: DISCONTINUED | OUTPATIENT
Start: 2022-10-24 | End: 2022-10-24

## 2022-10-24 RX ORDER — SODIUM CHLORIDE 9 MG/ML
3 INJECTION INTRAMUSCULAR; INTRAVENOUS; SUBCUTANEOUS EVERY 8 HOURS
Refills: 0 | Status: DISCONTINUED | OUTPATIENT
Start: 2022-10-24 | End: 2022-10-24

## 2022-10-24 RX ORDER — SODIUM CHLORIDE 9 MG/ML
1000 INJECTION, SOLUTION INTRAVENOUS
Refills: 0 | Status: DISCONTINUED | OUTPATIENT
Start: 2022-10-24 | End: 2022-11-07

## 2022-10-24 RX ORDER — LIDOCAINE HCL 20 MG/ML
0.2 VIAL (ML) INJECTION ONCE
Refills: 0 | Status: DISCONTINUED | OUTPATIENT
Start: 2022-10-24 | End: 2022-10-24

## 2022-10-24 RX ORDER — ONDANSETRON 8 MG/1
4 TABLET, FILM COATED ORAL ONCE
Refills: 0 | Status: DISCONTINUED | OUTPATIENT
Start: 2022-10-24 | End: 2022-10-24

## 2022-10-24 RX ADMIN — SODIUM CHLORIDE 100 MILLILITER(S): 9 INJECTION, SOLUTION INTRAVENOUS at 09:11

## 2022-10-24 NOTE — PRE-ANESTHESIA EVALUATION ADULT - ANESTHESIA, PREVIOUS REACTION, PROFILE
none Clear bilaterally, pupils equal, round and reactive to light. **ATTENDING ADDENDUM (Dr. Ry Ricci): Extraocular muscle movements intact. NO nystagmus.

## 2022-10-24 NOTE — ASU DISCHARGE PLAN (ADULT/PEDIATRIC) - NS MD DC FALL RISK RISK
For information on Fall & Injury Prevention, visit: [email or web address] OR  [email or web address] OR  https://www.cdc.gov/steadi/patient.html

## 2022-10-24 NOTE — ASU DISCHARGE PLAN (ADULT/PEDIATRIC) - ASU DC SPECIAL INSTRUCTIONSFT
CATHETER: patients are sent home with a cleaning catheter, If you still have a catheter, the nurses will review instructions and care before you go home.  GENERAL: It is common to have blood in your urine after your procedure. It may be pink or even red; inform your doctor if you have a significant amount of clot in the urine or if you are unable to void at all or if your catheter stops draining. The urine may clear and then become bloody again especially as you are more physically active. It is not uncommon to have some burning when you urinate, this will gradually improve. With a catheter in place, it is not uncommon to have occasional leakage or urine or blood around the catheter. Please call your urologist if this is excessive and/or the urine is not draining through the catheter into the bag.  BATHING: You may shower or bathe. If going home with cleaning, shower only until catheter is removed.  DIET: You may resume your regular diet and regular medication regimen.  PAIN: You may take Tylenol (acetaminophen) 650-975mg and/or Motrin (ibuprofen) 400-600mg, both available over the counter, for pain every 6 hours as needed. Do not exceed 4000mg of Tylenol (acetaminophen) daily. You may alternate these medications such that you take one or the other every 3 hours for around the clock pain coverage.  ANTIBIOTICS: You will be given a prescription for an antibiotic, please take this medication as instructed and be sure to complete the entire course.  STOOL SOFTENERS: Do not allow yourself to become constipated as straining may cause bleeding. Take stool softeners or a laxative (ex. Miralax, Colace, Senokot, ExLax, etc), available over the counter, if needed.  ACTIVITY: No heavy lifting or strenuous exercise until you are evaluated at your post-operative appointment. Otherwise, you may return to your usual level of physical activity.  ANTICOAGULATION: You may continue your baby aspirin  FOLLOW-UP: See Dr. Bell in Wednesday for cleaning removal   CALL YOUR UROLOGIST IF: You have any bleeding that does not stop, inability to void >8 hours, fever over 100.4 F, chills, persistent nausea/vomiting, changes in your incision concerning for infection, or if your pain is not controlled on your discharge pain medications.

## 2022-10-26 ENCOUNTER — NON-APPOINTMENT (OUTPATIENT)
Age: 82
End: 2022-10-26

## 2022-10-26 ENCOUNTER — OUTPATIENT (OUTPATIENT)
Dept: OUTPATIENT SERVICES | Facility: HOSPITAL | Age: 82
LOS: 1 days | End: 2022-10-26
Payer: MEDICARE

## 2022-10-26 ENCOUNTER — APPOINTMENT (OUTPATIENT)
Dept: UROLOGY | Facility: CLINIC | Age: 82
End: 2022-10-26

## 2022-10-26 VITALS
OXYGEN SATURATION: 99 % | BODY MASS INDEX: 26.01 KG/M2 | HEART RATE: 74 BPM | DIASTOLIC BLOOD PRESSURE: 77 MMHG | TEMPERATURE: 98 F | SYSTOLIC BLOOD PRESSURE: 150 MMHG | RESPIRATION RATE: 17 BRPM | HEIGHT: 72 IN | WEIGHT: 192 LBS

## 2022-10-26 DIAGNOSIS — R35.0 FREQUENCY OF MICTURITION: ICD-10-CM

## 2022-10-26 DIAGNOSIS — Z90.49 ACQUIRED ABSENCE OF OTHER SPECIFIED PARTS OF DIGESTIVE TRACT: Chronic | ICD-10-CM

## 2022-10-26 DIAGNOSIS — K59.09 OTHER CONSTIPATION: ICD-10-CM

## 2022-10-26 DIAGNOSIS — Z98.890 OTHER SPECIFIED POSTPROCEDURAL STATES: Chronic | ICD-10-CM

## 2022-10-26 LAB
-  AMIKACIN: SIGNIFICANT CHANGE UP
-  AMIKACIN: SIGNIFICANT CHANGE UP
-  AMPICILLIN/SULBACTAM: SIGNIFICANT CHANGE UP
-  AZTREONAM: SIGNIFICANT CHANGE UP
-  CEFEPIME: SIGNIFICANT CHANGE UP
-  CEFEPIME: SIGNIFICANT CHANGE UP
-  CEFTAZIDIME: SIGNIFICANT CHANGE UP
-  CEFTAZIDIME: SIGNIFICANT CHANGE UP
-  CEFTRIAXONE: SIGNIFICANT CHANGE UP
-  CIPROFLOXACIN: SIGNIFICANT CHANGE UP
-  CIPROFLOXACIN: SIGNIFICANT CHANGE UP
-  GENTAMICIN: SIGNIFICANT CHANGE UP
-  GENTAMICIN: SIGNIFICANT CHANGE UP
-  IMIPENEM: SIGNIFICANT CHANGE UP
-  IMIPENEM: SIGNIFICANT CHANGE UP
-  LEVOFLOXACIN: SIGNIFICANT CHANGE UP
-  LEVOFLOXACIN: SIGNIFICANT CHANGE UP
-  MEROPENEM: SIGNIFICANT CHANGE UP
-  MEROPENEM: SIGNIFICANT CHANGE UP
-  PIPERACILLIN/TAZOBACTAM: SIGNIFICANT CHANGE UP
-  TOBRAMYCIN: SIGNIFICANT CHANGE UP
-  TOBRAMYCIN: SIGNIFICANT CHANGE UP
-  TRIMETHOPRIM/SULFAMETHOXAZOLE: SIGNIFICANT CHANGE UP
ACE BLD-CCNC: 8 U/L
C3 SERPL-MCNC: 81 MG/DL
C4 SERPL-MCNC: 24 MG/DL
CCP AB SER IA-ACNC: <8 UNITS
DSDNA AB SER-ACNC: 19 IU/ML
ENA RNP AB SER IA-ACNC: <0.2 AL
ENA SCL70 IGG SER IA-ACNC: <0.2 AL
ENA SM AB SER IA-ACNC: <0.2 AL
ENA SS-A AB SER IA-ACNC: 5.8 AL
ENA SS-B AB SER IA-ACNC: <0.2 AL
METHOD TYPE: SIGNIFICANT CHANGE UP
METHOD TYPE: SIGNIFICANT CHANGE UP
RF+CCP IGG SER-IMP: NEGATIVE
RHEUMATOID FACT SER QL: 13 IU/ML
THYROGLOB AB SERPL-ACNC: <20 IU/ML
THYROPEROXIDASE AB SERPL IA-ACNC: 75.1 IU/ML

## 2022-10-26 PROCEDURE — 99024 POSTOP FOLLOW-UP VISIT: CPT

## 2022-10-26 PROCEDURE — 51702 INSERT TEMP BLADDER CATH: CPT

## 2022-10-26 PROCEDURE — 51702 INSERT TEMP BLADDER CATH: CPT | Mod: 58

## 2022-10-27 DIAGNOSIS — R33.9 RETENTION OF URINE, UNSPECIFIED: ICD-10-CM

## 2022-10-27 LAB
-  PIPERACILLIN/TAZOBACTAM: SIGNIFICANT CHANGE UP
CULTURE RESULTS: SIGNIFICANT CHANGE UP
METHOD TYPE: SIGNIFICANT CHANGE UP
ORGANISM # SPEC MICROSCOPIC CNT: SIGNIFICANT CHANGE UP
SPECIMEN SOURCE: SIGNIFICANT CHANGE UP

## 2022-11-02 ENCOUNTER — APPOINTMENT (OUTPATIENT)
Dept: UROLOGY | Facility: CLINIC | Age: 82
End: 2022-11-02

## 2022-11-02 ENCOUNTER — OUTPATIENT (OUTPATIENT)
Dept: OUTPATIENT SERVICES | Facility: HOSPITAL | Age: 82
LOS: 1 days | End: 2022-11-02
Payer: MEDICARE

## 2022-11-02 VITALS
TEMPERATURE: 98.1 F | OXYGEN SATURATION: 99 % | RESPIRATION RATE: 17 BRPM | SYSTOLIC BLOOD PRESSURE: 134 MMHG | HEIGHT: 72 IN | BODY MASS INDEX: 26.01 KG/M2 | HEART RATE: 75 BPM | WEIGHT: 192 LBS | DIASTOLIC BLOOD PRESSURE: 73 MMHG

## 2022-11-02 DIAGNOSIS — Z98.890 OTHER SPECIFIED POSTPROCEDURAL STATES: Chronic | ICD-10-CM

## 2022-11-02 DIAGNOSIS — R35.0 FREQUENCY OF MICTURITION: ICD-10-CM

## 2022-11-02 DIAGNOSIS — Z90.49 ACQUIRED ABSENCE OF OTHER SPECIFIED PARTS OF DIGESTIVE TRACT: Chronic | ICD-10-CM

## 2022-11-02 DIAGNOSIS — R33.9 RETENTION OF URINE, UNSPECIFIED: ICD-10-CM

## 2022-11-02 PROCEDURE — 99024 POSTOP FOLLOW-UP VISIT: CPT

## 2022-11-02 PROCEDURE — 51701 INSERT BLADDER CATHETER: CPT

## 2022-11-02 PROCEDURE — 51798 US URINE CAPACITY MEASURE: CPT

## 2022-11-02 PROCEDURE — 51701 INSERT BLADDER CATHETER: CPT | Mod: 78

## 2022-11-04 ENCOUNTER — NON-APPOINTMENT (OUTPATIENT)
Age: 82
End: 2022-11-04

## 2022-11-09 ENCOUNTER — APPOINTMENT (OUTPATIENT)
Dept: UROLOGY | Facility: CLINIC | Age: 82
End: 2022-11-09

## 2022-11-09 VITALS
HEIGHT: 72 IN | WEIGHT: 192 LBS | SYSTOLIC BLOOD PRESSURE: 129 MMHG | DIASTOLIC BLOOD PRESSURE: 74 MMHG | BODY MASS INDEX: 26.01 KG/M2 | HEART RATE: 65 BPM | TEMPERATURE: 97.6 F

## 2022-11-09 PROCEDURE — 99024 POSTOP FOLLOW-UP VISIT: CPT

## 2022-11-10 NOTE — HISTORY OF PRESENT ILLNESS
[FreeTextEntry1] : here for management of bothersome LUts.\par had seen DV in June for h/o longstanding nocturia 3 times associated with need to double/triple void to feel empty and feeling of not being empty.  he was started on tamsulosin but only took for 3 days given severe fatigue.\par he continues same symptoms. He has some daytime frequency but no urgency, incontinence of any kind; the FOS slower and can have hesitancy.\par No dysuria or hematuria.\par PVR - 1100cc\par ULS - no hydronephrosis. 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

## 2022-12-07 NOTE — PHYSICAL EXAM
Thanks for update!   [General Appearance - Well Developed] : well developed [General Appearance - Well Nourished] : well nourished [Normal Appearance] : normal appearance [Well Groomed] : well groomed [General Appearance - In No Acute Distress] : no acute distress [Edema] : no peripheral edema [Respiration, Rhythm And Depth] : normal respiratory rhythm and effort [Exaggerated Use Of Accessory Muscles For Inspiration] : no accessory muscle use [Abdomen Soft] : soft [Abdomen Tenderness] : non-tender [Costovertebral Angle Tenderness] : no ~M costovertebral angle tenderness [Urethral Meatus] : meatus normal [Urinary Bladder Findings] : the bladder was normal on palpation [Scrotum] : the scrotum was normal [Testes Mass (___cm)] : there were no testicular masses [No Prostate Nodules] : no prostate nodules [Prostate Size ___ (0-4)] : prostate size [unfilled] (scale: 0-4) [Normal Station and Gait] : the gait and station were normal for the patient's age [] : no rash [No Focal Deficits] : no focal deficits [Oriented To Time, Place, And Person] : oriented to person, place, and time [Affect] : the affect was normal [Mood] : the mood was normal [Not Anxious] : not anxious [No Palpable Adenopathy] : no palpable adenopathy [FreeTextEntry1] : Prostate gland dimensions X2.5, regular margins, regular consistency, no nodules, no pain reported upon pressure.

## 2022-12-09 ENCOUNTER — APPOINTMENT (OUTPATIENT)
Dept: UROLOGY | Facility: CLINIC | Age: 82
End: 2022-12-09

## 2022-12-09 PROCEDURE — 99024 POSTOP FOLLOW-UP VISIT: CPT

## 2022-12-09 NOTE — HISTORY OF PRESENT ILLNESS
[FreeTextEntry1] : here for management of bothersome LUts.\par had seen DV in June for h/o longstanding nocturia 3 times associated with need to double/triple void to feel empty and feeling of not being empty.  he was started on tamsulosin but only took for 3 days given severe fatigue.\par he continues same symptoms. He has some daytime frequency but no urgency, incontinence of any kind; the FOS slower and can have hesitancy.\par No dysuria or hematuria.\par PVR - 1100cc\par ULS - no hydronephrosis. 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 Not really getting an urge and doing by clock.\par getting frustrated

## 2022-12-09 NOTE — ASSESSMENT
[FreeTextEntry1] : increase to 5 times a day.\par still feel he will start voiding given original PVR 1000cc but will get UroD to document function or not

## 2022-12-13 NOTE — ASU PREOP CHECKLIST - WAS PATIENT ON BETA BLOCKER?
Patient: Jami Perez Date: 2022   : 1939 Attending: Car Baer MD   83 year old female      [facility]    PROCEDURE ASSESSMENT   (LOCAL ANESTHESIA - Not for use with Conscious Sedation)    Preop Diagnosis / Indications for Procedure: right inguinal lymph node    Planned Procedure: lymph node biopsy    Planned Anesthetic:  local      MEDICAL HISTORY / COMORBID CONDITIONS:  Significant medical / surgical history: yes reviewed    Normal mental status:   yes       EXAMINATION PERTINENT TO PROCEDURE BEING PERFORMED  Evaluation of operative site performed      OTHER FINDINGS  Reviewed current medications and allergies yes      PERTINENT LAB / DIAGNOSTIC TESTS  reviewed      INFORMED CONSENT  Consent obtained: yes    Risks / benefits, complications, and alternatives explained, questions answered and patient / personal representative agrees to:  procedure listed above and anesthetic listed above   No

## 2022-12-28 ENCOUNTER — RX RENEWAL (OUTPATIENT)
Age: 82
End: 2022-12-28

## 2023-01-03 ENCOUNTER — RX RENEWAL (OUTPATIENT)
Age: 83
End: 2023-01-03

## 2023-01-05 RX ORDER — QUINAPRIL HYDROCHLORIDE 20 MG/1
20 TABLET, FILM COATED ORAL
Qty: 180 | Refills: 0 | Status: DISCONTINUED | COMMUNITY
Start: 2021-03-02 | End: 2023-01-05

## 2023-01-13 ENCOUNTER — OUTPATIENT (OUTPATIENT)
Dept: OUTPATIENT SERVICES | Facility: HOSPITAL | Age: 83
LOS: 1 days | End: 2023-01-13
Payer: MEDICARE

## 2023-01-13 ENCOUNTER — APPOINTMENT (OUTPATIENT)
Dept: UROLOGY | Facility: CLINIC | Age: 83
End: 2023-01-13
Payer: MEDICARE

## 2023-01-13 VITALS
DIASTOLIC BLOOD PRESSURE: 80 MMHG | HEART RATE: 64 BPM | HEIGHT: 72 IN | OXYGEN SATURATION: 98 % | TEMPERATURE: 96.9 F | SYSTOLIC BLOOD PRESSURE: 156 MMHG

## 2023-01-13 DIAGNOSIS — Z90.49 ACQUIRED ABSENCE OF OTHER SPECIFIED PARTS OF DIGESTIVE TRACT: Chronic | ICD-10-CM

## 2023-01-13 DIAGNOSIS — R35.0 FREQUENCY OF MICTURITION: ICD-10-CM

## 2023-01-13 DIAGNOSIS — Z98.890 OTHER SPECIFIED POSTPROCEDURAL STATES: Chronic | ICD-10-CM

## 2023-01-13 PROCEDURE — 51741 ELECTRO-UROFLOWMETRY FIRST: CPT | Mod: 26

## 2023-01-13 PROCEDURE — 51728 CYSTOMETROGRAM W/VP: CPT

## 2023-01-13 PROCEDURE — 51728 CYSTOMETROGRAM W/VP: CPT | Mod: 26,79

## 2023-01-13 PROCEDURE — 51797 INTRAABDOMINAL PRESSURE TEST: CPT | Mod: 26

## 2023-01-13 PROCEDURE — 51741 ELECTRO-UROFLOWMETRY FIRST: CPT

## 2023-01-13 PROCEDURE — 51784 ANAL/URINARY MUSCLE STUDY: CPT

## 2023-01-13 PROCEDURE — 51797 INTRAABDOMINAL PRESSURE TEST: CPT

## 2023-01-13 PROCEDURE — 51784 ANAL/URINARY MUSCLE STUDY: CPT | Mod: 26

## 2023-01-17 DIAGNOSIS — R33.9 RETENTION OF URINE, UNSPECIFIED: ICD-10-CM

## 2023-01-30 ENCOUNTER — APPOINTMENT (OUTPATIENT)
Dept: FAMILY MEDICINE | Facility: CLINIC | Age: 83
End: 2023-01-30
Payer: MEDICARE

## 2023-01-30 VITALS
WEIGHT: 195 LBS | HEART RATE: 69 BPM | SYSTOLIC BLOOD PRESSURE: 125 MMHG | BODY MASS INDEX: 26.41 KG/M2 | DIASTOLIC BLOOD PRESSURE: 75 MMHG | HEIGHT: 72 IN | OXYGEN SATURATION: 98 %

## 2023-01-30 PROCEDURE — 36415 COLL VENOUS BLD VENIPUNCTURE: CPT

## 2023-01-30 PROCEDURE — 99214 OFFICE O/P EST MOD 30 MIN: CPT | Mod: 25

## 2023-01-30 RX ORDER — CEFUROXIME AXETIL 500 MG/1
500 TABLET ORAL
Qty: 14 | Refills: 0 | Status: DISCONTINUED | COMMUNITY
Start: 2022-11-04 | End: 2023-01-30

## 2023-01-30 RX ORDER — TAMSULOSIN HYDROCHLORIDE 0.4 MG/1
0.4 CAPSULE ORAL
Qty: 30 | Refills: 0 | Status: DISCONTINUED | COMMUNITY
Start: 2022-06-24 | End: 2023-01-30

## 2023-01-30 NOTE — HISTORY OF PRESENT ILLNESS
[FreeTextEntry1] : Here for follow-up; needs med refills.\par  [de-identified] : Here for follow-up; needs med refills.\par \par Checking at home getting 130s.  \par High 120s-\par DBP in the 70s. \par \par Has been tolerating switch to lisinopril without any problems. \par Has been following with Urology-now has to self-cathetherize. \par \par No recent UTI.\par \par Saw Dermatology within the past few months.  \par Medications and allergies reviewed.\par

## 2023-01-30 NOTE — PLAN
[FreeTextEntry1] : Will follow up labwork drawn in office today.\par \par Discussed blood pressure monitoring and normal ranges for BP.  Keep BP log.  Advised elevated readings or symptoms require immediate evaluation. Continue to monitor BP-recent switch to lisinopril. \par \par Advised patient if they have a home blood pressure cuff, they may bring it with them to their next visit and compare machine readings.  \par Avoid added salt in diet.\par \par Patient expressed understanding.\par

## 2023-01-30 NOTE — REVIEW OF SYSTEMS
[Negative] : Gastrointestinal [Fever] : no fever [Chills] : no chills [Nasal Discharge] : no nasal discharge [Sore Throat] : no sore throat [Chest Pain] : no chest pain [Palpitations] : no palpitations [Shortness Of Breath] : no shortness of breath [Wheezing] : no wheezing [Headache] : no headache [Dizziness] : no dizziness

## 2023-01-31 LAB
ALBUMIN SERPL ELPH-MCNC: 4.7 G/DL
ALP BLD-CCNC: 59 U/L
ALT SERPL-CCNC: 13 U/L
ANION GAP SERPL CALC-SCNC: 12 MMOL/L
AST SERPL-CCNC: 24 U/L
BASOPHILS # BLD AUTO: 0.05 K/UL
BASOPHILS NFR BLD AUTO: 0.6 %
BILIRUB SERPL-MCNC: 0.6 MG/DL
BUN SERPL-MCNC: 28 MG/DL
CALCIUM SERPL-MCNC: 10.2 MG/DL
CHLORIDE SERPL-SCNC: 99 MMOL/L
CHOLEST SERPL-MCNC: 159 MG/DL
CO2 SERPL-SCNC: 28 MMOL/L
CREAT SERPL-MCNC: 0.97 MG/DL
EGFR: 79 ML/MIN/1.73M2
EOSINOPHIL # BLD AUTO: 0.28 K/UL
EOSINOPHIL NFR BLD AUTO: 3.6 %
ESTIMATED AVERAGE GLUCOSE: 103 MG/DL
GLUCOSE SERPL-MCNC: 83 MG/DL
HBA1C MFR BLD HPLC: 5.2 %
HCT VFR BLD CALC: 43.1 %
HDLC SERPL-MCNC: 66 MG/DL
HGB BLD-MCNC: 14.1 G/DL
IMM GRANULOCYTES NFR BLD AUTO: 0.3 %
LDLC SERPL CALC-MCNC: 79 MG/DL
LYMPHOCYTES # BLD AUTO: 2.24 K/UL
LYMPHOCYTES NFR BLD AUTO: 28.6 %
MAN DIFF?: NORMAL
MCHC RBC-ENTMCNC: 32.2 PG
MCHC RBC-ENTMCNC: 32.7 GM/DL
MCV RBC AUTO: 98.4 FL
MONOCYTES # BLD AUTO: 0.71 K/UL
MONOCYTES NFR BLD AUTO: 9.1 %
NEUTROPHILS # BLD AUTO: 4.52 K/UL
NEUTROPHILS NFR BLD AUTO: 57.8 %
NONHDLC SERPL-MCNC: 93 MG/DL
PLATELET # BLD AUTO: 201 K/UL
POTASSIUM SERPL-SCNC: 4.3 MMOL/L
PROT SERPL-MCNC: 7.1 G/DL
RBC # BLD: 4.38 M/UL
RBC # FLD: 13.6 %
SODIUM SERPL-SCNC: 139 MMOL/L
TRIGL SERPL-MCNC: 72 MG/DL
TSH SERPL-ACNC: 1.63 UIU/ML
WBC # FLD AUTO: 7.82 K/UL

## 2023-02-07 ENCOUNTER — APPOINTMENT (OUTPATIENT)
Dept: NEUROLOGY | Facility: CLINIC | Age: 83
End: 2023-02-07
Payer: MEDICARE

## 2023-02-07 VITALS
DIASTOLIC BLOOD PRESSURE: 78 MMHG | WEIGHT: 195 LBS | HEIGHT: 72 IN | BODY MASS INDEX: 26.41 KG/M2 | HEART RATE: 54 BPM | SYSTOLIC BLOOD PRESSURE: 147 MMHG

## 2023-02-07 DIAGNOSIS — R76.8 OTHER SPECIFIED ABNORMAL IMMUNOLOGICAL FINDINGS IN SERUM: ICD-10-CM

## 2023-02-07 DIAGNOSIS — G62.89 OTHER SPECIFIED POLYNEUROPATHIES: ICD-10-CM

## 2023-02-07 DIAGNOSIS — Z15.89 GENETIC SUSCEPTIBILITY TO OTHER DISEASE: ICD-10-CM

## 2023-02-07 PROCEDURE — 99215 OFFICE O/P EST HI 40 MIN: CPT

## 2023-02-14 ENCOUNTER — APPOINTMENT (OUTPATIENT)
Dept: UROLOGY | Facility: CLINIC | Age: 83
End: 2023-02-14

## 2023-03-10 ENCOUNTER — APPOINTMENT (OUTPATIENT)
Dept: UROLOGY | Facility: CLINIC | Age: 83
End: 2023-03-10
Payer: MEDICARE

## 2023-03-10 PROCEDURE — 99212 OFFICE O/P EST SF 10 MIN: CPT

## 2023-03-10 NOTE — HISTORY OF PRESENT ILLNESS
[FreeTextEntry1] : here for management of bothersome LUts.\par had seen DV in June for h/o longstanding nocturia 3 times associated with need to double/triple void to feel empty and feeling of not being empty.  he was started on tamsulosin but only took for 3 days given severe fatigue.\par he continues same symptoms. He has some daytime frequency but no urgency, incontinence of any kind; the FOS slower and can have hesitancy.\par No dysuria or hematuria.\par PVR - 1100cc\par ULS - no hydronephrosis. 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 Not really getting an urge and doing by clock.\par getting frustrated \par \par 3/23 - had UroD - poor contraction.\par On CIC 4-5 times - rare urge to void but no urine

## 2023-03-14 LAB — BACTERIA UR CULT: ABNORMAL

## 2023-03-22 PROBLEM — R76.8 ELEVATED ANTINUCLEAR ANTIBODY (ANA) LEVEL: Status: ACTIVE | Noted: 2022-09-27

## 2023-03-22 PROBLEM — G62.89 AXONAL SENSORIMOTOR NEUROPATHY: Status: ACTIVE | Noted: 2022-09-27

## 2023-03-22 PROBLEM — R76.8 ANTI-TPO ANTIBODIES PRESENT: Status: ACTIVE | Noted: 2023-03-22

## 2023-03-22 PROBLEM — Z15.89: Status: ACTIVE | Noted: 2023-03-22

## 2023-03-22 NOTE — HISTORY OF PRESENT ILLNESS
[FreeTextEntry1] : FROM 4/14/22:\par This is an 82-year-old right-handed man who has been experiencing numbness throughout both feet, extending up to the mid-calf level, for at least 7 years. He saw a neurologist, Dr. Vikas Berger, about 6 years ago for these symptoms, and was found to have diffuse polyneuropathy involving both legs on EMG/NCV. The patient also had a left foot drop for many years, and notes that his mother also had a severe foot drop requiring orthotics.\par \par FROM 9/26/22:\par Since I last saw the patient on 4/14/22, he has been evaluated by Neuromuscular specialist, Dr. Mau Seymour, and has undergone additional testing. He continues to experience persistent numbness throughout both feet, as well as varying intensity of tightness in both feet. He has been experiencing more difficulty with maintaining his balance when walking, but he has not experienced any recent.falls.\par \par FROM 2/7/23:\par Since I last saw the patient on 9/26/22, he has continued to experience numbness in both feet, affecting his balance while walking, but without recent falls. He did not tolerate Amitriptyline as prescribed last fall due to excessive drowsiness, so he stopped taking it.

## 2023-03-22 NOTE — ASSESSMENT
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

## 2023-03-22 NOTE — PHYSICAL EXAM
[General Appearance - Alert] : alert [General Appearance - In No Acute Distress] : in no acute distress [Oriented To Time, Place, And Person] : oriented to person, place, and time [Impaired Insight] : insight and judgment were intact [Affect] : the affect was normal [Person] : oriented to person [Place] : oriented to place [Time] : oriented to time [Concentration Intact] : normal concentrating ability [Visual Intact] : visual attention was ~T not ~L decreased [Fluency] : fluency intact [Comprehension] : comprehension intact [Cranial Nerves Optic (II)] : visual acuity intact bilaterally,  visual fields full to confrontation, pupils equal round and reactive to light [Cranial Nerves Oculomotor (III)] : extraocular motion intact [Cranial Nerves Trigeminal (V)] : facial sensation intact symmetrically [Cranial Nerves Facial (VII)] : face symmetrical [Cranial Nerves Vestibulocochlear (VIII)] : hearing was intact bilaterally [Cranial Nerves Glossopharyngeal (IX)] : tongue and palate midline [Cranial Nerves Accessory (XI - Cranial And Spinal)] : head turning and shoulder shrug symmetric [Cranial Nerves Hypoglossal (XII)] : there was no tongue deviation with protrusion [Motor Tone] : muscle tone was normal in all four extremities [Motor Strength] : muscle strength was normal in all four extremities [No Muscle Atrophy] : normal bulk in all four extremities [Motor Handedness Right-Handed] : the patient is right hand dominant [Motor Strength Lower Extremities Bilaterally] : there was weakness in both lower extremities [5] : knee flexion 5/5 [4] : knee extension 4/5 [2] : ankle dorsiflexion 2/5 [3] : ankle plantar flexion 3/5 [Tactile Decrease Entire Leg Right] : diminished right leg [Tactile Decrease Entire Leg Left] : diminished left leg [Pain / Temp Decrease Entire Leg - Right] : diminished right leg [Pain / Temp Decrease Entire Leg - Left] : diminished left leg [Abnormal Walk] : normal gait [Balance] : balance was intact [2+] : Triceps left 2+ [1+] : Patella left 1+ [0] : Ankle jerk left 0 [Sclera] : the sclera and conjunctiva were normal [PERRL With Normal Accommodation] : pupils were equal in size, round, reactive to light, with normal accommodation [Extraocular Movements] : extraocular movements were intact [Outer Ear] : the ears and nose were normal in appearance [Oropharynx] : the oropharynx was normal [Neck Appearance] : the appearance of the neck was normal [Auscultation Breath Sounds / Voice Sounds] : lungs were clear to auscultation bilaterally [Heart Rate And Rhythm] : heart rate was normal and rhythm regular [Heart Sounds] : normal S1 and S2 [Arterial Pulses Carotid] : carotid pulses were normal with no bruits [Full Pulse] : the pedal pulses are present [Abdomen Soft] : soft [Abdomen Tenderness] : non-tender [No CVA Tenderness] : no ~M costovertebral angle tenderness [No Spinal Tenderness] : no spinal tenderness [Nail Clubbing] : no clubbing  or cyanosis of the fingernails [Skin Color & Pigmentation] : normal skin color and pigmentation [] : no rash [Paresis Pronator Drift Left-Sided] : no pronator drift on the left [Paresis Pronator Drift Right-Sided] : no pronator drift on the right [Motor Strength Upper Extremities Bilaterally] : strength was normal in both upper extremities [Romberg's Sign] : Romberg's sign was negtive [Allodynia] : no ~T allodynia present [Tactile Decrease Shoulders Right] : normal right shoulder [Tactile Decrease Shoulders Left] : normal left shoulder [Tactile Decrease Entire Left Arm] : normal left arm [Tactile Decrease Entire Right Arm] : normal right arm [Tactile Decrease Hand] : normal left hand [Pain / Temperature Decrease Shoulders Right Only] : normal right shoulder [Pain / Temperature Decrease Shoulders Left Only] : normal left shoulder [Pain / Temperature Decrease Entire Arm Right] : normal right arm [Pain / Temperature Decrease Entire Arm Left] : normal left arm [Pain / Temp Decrease Hand] : normal left hand [Past-pointing] : there was no past-pointing [Tremor] : no tremor present [Coordination - Dysmetria Impaired Finger-to-Nose Bilateral] : not present [Coordination - Dysmetria Impaired Heel-to-Shin Bilateral] : not present [Plantar Reflex Right Only] : normal on the right [Plantar Reflex Left Only] : normal on the left [___] : absent on the right [___] : absent on the left [FreeTextEntry8] : Normal, narrow-based gait. Unable to initiate specialized gaits due to left foot drop.

## 2023-03-30 ENCOUNTER — RX RENEWAL (OUTPATIENT)
Age: 83
End: 2023-03-30

## 2023-03-31 ENCOUNTER — RX RENEWAL (OUTPATIENT)
Age: 83
End: 2023-03-31

## 2023-04-03 ENCOUNTER — RX RENEWAL (OUTPATIENT)
Age: 83
End: 2023-04-03

## 2023-05-25 ENCOUNTER — APPOINTMENT (OUTPATIENT)
Dept: NEUROLOGY | Facility: CLINIC | Age: 83
End: 2023-05-25

## 2023-05-29 ENCOUNTER — RX RENEWAL (OUTPATIENT)
Age: 83
End: 2023-05-29

## 2023-06-16 ENCOUNTER — APPOINTMENT (OUTPATIENT)
Dept: FAMILY MEDICINE | Facility: CLINIC | Age: 83
End: 2023-06-16
Payer: MEDICARE

## 2023-06-16 VITALS
OXYGEN SATURATION: 97 % | BODY MASS INDEX: 27.63 KG/M2 | HEIGHT: 72 IN | HEART RATE: 59 BPM | TEMPERATURE: 97.3 F | SYSTOLIC BLOOD PRESSURE: 132 MMHG | WEIGHT: 204 LBS | DIASTOLIC BLOOD PRESSURE: 76 MMHG

## 2023-06-16 PROCEDURE — 36415 COLL VENOUS BLD VENIPUNCTURE: CPT

## 2023-06-16 PROCEDURE — G0439: CPT

## 2023-06-16 NOTE — HEALTH RISK ASSESSMENT
[Yes] : Yes [4 or more  times a week (4 pts)] : 4 or more  times a week (4 points) [No falls in past year] : Patient reported no falls in the past year [# of Members in Household ___] :  household currently consist of [unfilled] member(s) [Retired] : retired [] :  [> 15 Years] : > 15 Years [de-identified] : Varied diet  [de-identified] : up to date on dentist 6 months  [de-identified] : reading glasses eye exam spring 2023  [de-identified] : quit 30 years

## 2023-06-16 NOTE — PLAN
[FreeTextEntry1] : -Got COVID updated booster. \par \par Will follow up labwork drawn in office today.\par \par Sleep Hygiene discussed, consistent sleep and wake schedule, discussed trial of melatonin. \par \par Discussed PT evaluation if balance worsening-patient will consider. \par \par Will adjust meds based on labs. \par Patient expressed understanding of plan.\par \par \par

## 2023-06-16 NOTE — HISTORY OF PRESENT ILLNESS
[FreeTextEntry1] : Here for follow-up; needs med refills.\par  [de-identified] : Here for follow-up; needs med refills.\par \par Napping during the day briefly.  \par 6 hours of sleep per night.  Consistent schedule.  \par \par Saw Derm, Neurology, Urology earlier this year.  \par Feels some difficulty with balance with  neuropathy.

## 2023-06-16 NOTE — PHYSICAL EXAM
[No Acute Distress] : no acute distress [Well Developed] : well developed [Normal Oropharynx] : the oropharynx was normal [Normal TMs] : both tympanic membranes were normal [No Lymphadenopathy] : no lymphadenopathy [Normal Sclera/Conjunctiva] : normal sclera/conjunctiva [PERRL] : pupils equal round and reactive to light [No Edema] : there was no peripheral edema [No Extremity Clubbing/Cyanosis] : no extremity clubbing/cyanosis [Normal] : affect was normal and insight and judgment were intact

## 2023-06-16 NOTE — REVIEW OF SYSTEMS
[Negative] : Gastrointestinal [Insomnia] : insomnia [Fever] : no fever [Chills] : no chills [Earache] : no earache [Nasal Discharge] : no nasal discharge [Sore Throat] : no sore throat [Chest Pain] : no chest pain [Palpitations] : no palpitations [Wheezing] : no wheezing [Cough] : no cough [Headache] : no headache [Dizziness] : no dizziness [FreeTextEntry8] : self caths

## 2023-06-19 LAB
ALBUMIN SERPL ELPH-MCNC: 4.6 G/DL
ALP BLD-CCNC: 56 U/L
ALT SERPL-CCNC: 15 U/L
ANION GAP SERPL CALC-SCNC: 9 MMOL/L
AST SERPL-CCNC: 24 U/L
BILIRUB SERPL-MCNC: 0.6 MG/DL
BUN SERPL-MCNC: 21 MG/DL
CALCIUM SERPL-MCNC: 10.1 MG/DL
CHLORIDE SERPL-SCNC: 103 MMOL/L
CHOLEST SERPL-MCNC: 154 MG/DL
CO2 SERPL-SCNC: 31 MMOL/L
CREAT SERPL-MCNC: 0.98 MG/DL
EGFR: 77 ML/MIN/1.73M2
ESTIMATED AVERAGE GLUCOSE: 105 MG/DL
FOLATE SERPL-MCNC: >20 NG/ML
GLUCOSE SERPL-MCNC: 94 MG/DL
HBA1C MFR BLD HPLC: 5.3 %
HDLC SERPL-MCNC: 65 MG/DL
LDLC SERPL CALC-MCNC: 80 MG/DL
NONHDLC SERPL-MCNC: 89 MG/DL
POTASSIUM SERPL-SCNC: 4.7 MMOL/L
PROT SERPL-MCNC: 7 G/DL
SODIUM SERPL-SCNC: 142 MMOL/L
TRIGL SERPL-MCNC: 48 MG/DL
TSH SERPL-ACNC: 2.98 UIU/ML
VIT B12 SERPL-MCNC: 671 PG/ML

## 2023-06-26 ENCOUNTER — RX RENEWAL (OUTPATIENT)
Age: 83
End: 2023-06-26

## 2023-06-29 ENCOUNTER — RX RENEWAL (OUTPATIENT)
Age: 83
End: 2023-06-29

## 2023-07-21 ENCOUNTER — APPOINTMENT (OUTPATIENT)
Dept: UROLOGY | Facility: CLINIC | Age: 83
End: 2023-07-21
Payer: MEDICARE

## 2023-07-21 PROCEDURE — 99212 OFFICE O/P EST SF 10 MIN: CPT

## 2023-07-21 NOTE — HISTORY OF PRESENT ILLNESS
[FreeTextEntry1] : here for management of bothersome LUts.\par had seen DV in June for h/o longstanding nocturia 3 times associated with need to double/triple void to feel empty and feeling of not being empty.  he was started on tamsulosin but only took for 3 days given severe fatigue.\par he continues same symptoms. He has some daytime frequency but no urgency, incontinence of any kind; the FOS slower and can have hesitancy.\par No dysuria or hematuria.\par PVR - 1100cc\par ULS - no hydronephrosis. 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 Not really getting an urge and doing by clock.\par getting frustrated \par \par 3/23 - had UroD - poor contraction.\par On CIC 4-5 times - rare urge to void but no urine \par \par 7/23 CIC TID - no issues.\par usually by the clock 0 not real urge to void and voids very little if any\par no UTI symptoms or hematurias

## 2023-08-18 ENCOUNTER — APPOINTMENT (OUTPATIENT)
Dept: FAMILY MEDICINE | Facility: CLINIC | Age: 83
End: 2023-08-18
Payer: MEDICARE

## 2023-08-18 VITALS
SYSTOLIC BLOOD PRESSURE: 120 MMHG | HEART RATE: 96 BPM | OXYGEN SATURATION: 98 % | TEMPERATURE: 97.7 F | DIASTOLIC BLOOD PRESSURE: 72 MMHG | WEIGHT: 202 LBS | BODY MASS INDEX: 27.36 KG/M2 | HEIGHT: 72 IN

## 2023-08-18 DIAGNOSIS — M65.30 TRIGGER FINGER, UNSPECIFIED FINGER: ICD-10-CM

## 2023-08-18 DIAGNOSIS — E03.9 HYPOTHYROIDISM, UNSPECIFIED: ICD-10-CM

## 2023-08-18 PROCEDURE — 36415 COLL VENOUS BLD VENIPUNCTURE: CPT

## 2023-08-18 PROCEDURE — 99214 OFFICE O/P EST MOD 30 MIN: CPT | Mod: 25

## 2023-08-18 NOTE — HISTORY OF PRESENT ILLNESS
[FreeTextEntry8] : Tingling in neck- Feels fluid in the ear-denies any muffled hearing. Feels discomfort in side of face.  Denies any weakness or drooling, vision changes. Tingling has been going on for a long time.    Right ring finger. Locking up.   Denies any loss of  strength.   Has polyneuropathy.   Just saw Urology.   Has been feeling tired. Had sepsis earlier in the year.

## 2023-08-18 NOTE — PLAN
[FreeTextEntry1] : Trigger Finger-Ortho Hand eval.  Fatigue-recheck labs-stable at last visit.   Tingling-Neurology follow-up;  Discussed possible TMJ with discomfort component-patient notes he has mouthguard.  Will adjust meds based on labs.  Patient expressed understanding of plan.

## 2023-08-18 NOTE — PHYSICAL EXAM
[No Acute Distress] : no acute distress [Well Developed] : well developed [Normal Sclera/Conjunctiva] : normal sclera/conjunctiva [PERRL] : pupils equal round and reactive to light [Normal Oropharynx] : the oropharynx was normal [Normal TMs] : both tympanic membranes were normal [No Lymphadenopathy] : no lymphadenopathy [No Edema] : there was no peripheral edema [No Extremity Clubbing/Cyanosis] : no extremity clubbing/cyanosis [Normal] : affect was normal and insight and judgment were intact [de-identified] :  strength intact bilaterally; upper back paraspinal tension

## 2023-08-18 NOTE — REVIEW OF SYSTEMS
[Negative] : Gastrointestinal [Fever] : no fever [Chills] : no chills [Nasal Discharge] : no nasal discharge [Sore Throat] : no sore throat [Chest Pain] : no chest pain [Palpitations] : no palpitations [Shortness Of Breath] : no shortness of breath [Wheezing] : no wheezing [Headache] : no headache [Dizziness] : no dizziness [FreeTextEntry8] : self cath [de-identified] : 5-6 hours; less sleep than previously; interrupted

## 2023-08-20 LAB
ALBUMIN SERPL ELPH-MCNC: 4.7 G/DL
ALP BLD-CCNC: 58 U/L
ALT SERPL-CCNC: 16 U/L
ANION GAP SERPL CALC-SCNC: 15 MMOL/L
AST SERPL-CCNC: 26 U/L
BILIRUB SERPL-MCNC: 0.7 MG/DL
BUN SERPL-MCNC: 17 MG/DL
CALCIUM SERPL-MCNC: 10.3 MG/DL
CHLORIDE SERPL-SCNC: 93 MMOL/L
CHOLEST SERPL-MCNC: 172 MG/DL
CO2 SERPL-SCNC: 28 MMOL/L
CREAT SERPL-MCNC: 1 MG/DL
EGFR: 76 ML/MIN/1.73M2
ESTIMATED AVERAGE GLUCOSE: 103 MG/DL
FOLATE SERPL-MCNC: >20 NG/ML
GLUCOSE SERPL-MCNC: 87 MG/DL
HBA1C MFR BLD HPLC: 5.2 %
HDLC SERPL-MCNC: 65 MG/DL
LDLC SERPL CALC-MCNC: 82 MG/DL
NONHDLC SERPL-MCNC: 107 MG/DL
POTASSIUM SERPL-SCNC: 3.4 MMOL/L
PROT SERPL-MCNC: 7.4 G/DL
SODIUM SERPL-SCNC: 136 MMOL/L
TRIGL SERPL-MCNC: 148 MG/DL
TSH SERPL-ACNC: 3.46 UIU/ML
VIT B12 SERPL-MCNC: 634 PG/ML

## 2023-09-05 ENCOUNTER — RX RENEWAL (OUTPATIENT)
Age: 83
End: 2023-09-05

## 2023-09-11 ENCOUNTER — RX RENEWAL (OUTPATIENT)
Age: 83
End: 2023-09-11

## 2023-09-22 ENCOUNTER — APPOINTMENT (OUTPATIENT)
Dept: ORTHOPEDIC SURGERY | Facility: CLINIC | Age: 83
End: 2023-09-22
Payer: MEDICARE

## 2023-09-22 DIAGNOSIS — M65.341 TRIGGER FINGER, RIGHT RING FINGER: ICD-10-CM

## 2023-09-22 PROCEDURE — 99203 OFFICE O/P NEW LOW 30 MIN: CPT

## 2023-11-08 ENCOUNTER — RX RENEWAL (OUTPATIENT)
Age: 83
End: 2023-11-08

## 2023-11-16 ENCOUNTER — APPOINTMENT (OUTPATIENT)
Dept: CARDIOLOGY | Facility: CLINIC | Age: 83
End: 2023-11-16
Payer: MEDICARE

## 2023-11-16 ENCOUNTER — NON-APPOINTMENT (OUTPATIENT)
Age: 83
End: 2023-11-16

## 2023-11-16 VITALS
OXYGEN SATURATION: 98 % | SYSTOLIC BLOOD PRESSURE: 124 MMHG | HEART RATE: 56 BPM | HEIGHT: 72 IN | DIASTOLIC BLOOD PRESSURE: 68 MMHG | BODY MASS INDEX: 25.33 KG/M2 | WEIGHT: 187 LBS

## 2023-11-16 DIAGNOSIS — R06.09 OTHER FORMS OF DYSPNEA: ICD-10-CM

## 2023-11-16 PROCEDURE — 93000 ELECTROCARDIOGRAM COMPLETE: CPT

## 2023-11-16 PROCEDURE — 99204 OFFICE O/P NEW MOD 45 MIN: CPT

## 2023-11-17 ENCOUNTER — APPOINTMENT (OUTPATIENT)
Dept: INTERNAL MEDICINE | Facility: CLINIC | Age: 83
End: 2023-11-17
Payer: MEDICARE

## 2023-11-17 PROCEDURE — 93306 TTE W/DOPPLER COMPLETE: CPT

## 2023-11-30 ENCOUNTER — RX RENEWAL (OUTPATIENT)
Age: 83
End: 2023-11-30

## 2023-12-05 ENCOUNTER — APPOINTMENT (OUTPATIENT)
Dept: NEUROLOGY | Facility: CLINIC | Age: 83
End: 2023-12-05

## 2023-12-06 ENCOUNTER — RX RENEWAL (OUTPATIENT)
Age: 83
End: 2023-12-06

## 2023-12-31 PROBLEM — Z23 NEED FOR VACCINATION WITH 13-POLYVALENT PNEUMOCOCCAL CONJUGATE VACCINE: Status: RESOLVED | Noted: 2017-11-20 | Resolved: 2022-10-11

## 2024-02-02 ENCOUNTER — TRANSCRIPTION ENCOUNTER (OUTPATIENT)
Age: 84
End: 2024-02-02

## 2024-02-02 ENCOUNTER — INPATIENT (INPATIENT)
Facility: HOSPITAL | Age: 84
LOS: 0 days | Discharge: HOME | DRG: 310 | End: 2024-02-02
Attending: STUDENT IN AN ORGANIZED HEALTH CARE EDUCATION/TRAINING PROGRAM | Admitting: STUDENT IN AN ORGANIZED HEALTH CARE EDUCATION/TRAINING PROGRAM
Payer: COMMERCIAL

## 2024-02-02 VITALS
HEART RATE: 79 BPM | OXYGEN SATURATION: 98 % | DIASTOLIC BLOOD PRESSURE: 72 MMHG | SYSTOLIC BLOOD PRESSURE: 143 MMHG | RESPIRATION RATE: 18 BRPM

## 2024-02-02 VITALS
OXYGEN SATURATION: 92 % | SYSTOLIC BLOOD PRESSURE: 98 MMHG | HEIGHT: 72 IN | RESPIRATION RATE: 19 BRPM | HEART RATE: 150 BPM | DIASTOLIC BLOOD PRESSURE: 71 MMHG | WEIGHT: 186.95 LBS

## 2024-02-02 DIAGNOSIS — N40.0 BENIGN PROSTATIC HYPERPLASIA WITHOUT LOWER URINARY TRACT SYMPTOMS: ICD-10-CM

## 2024-02-02 DIAGNOSIS — I48.91 UNSPECIFIED ATRIAL FIBRILLATION: ICD-10-CM

## 2024-02-02 DIAGNOSIS — Z98.890 OTHER SPECIFIED POSTPROCEDURAL STATES: Chronic | ICD-10-CM

## 2024-02-02 DIAGNOSIS — E78.5 HYPERLIPIDEMIA, UNSPECIFIED: ICD-10-CM

## 2024-02-02 DIAGNOSIS — I10 ESSENTIAL (PRIMARY) HYPERTENSION: ICD-10-CM

## 2024-02-02 DIAGNOSIS — Z29.9 ENCOUNTER FOR PROPHYLACTIC MEASURES, UNSPECIFIED: ICD-10-CM

## 2024-02-02 DIAGNOSIS — Z90.49 ACQUIRED ABSENCE OF OTHER SPECIFIED PARTS OF DIGESTIVE TRACT: Chronic | ICD-10-CM

## 2024-02-02 DIAGNOSIS — E03.9 HYPOTHYROIDISM, UNSPECIFIED: ICD-10-CM

## 2024-02-02 LAB
ALBUMIN SERPL ELPH-MCNC: 4.2 G/DL — SIGNIFICANT CHANGE UP (ref 3.3–5)
ALP SERPL-CCNC: 43 U/L — SIGNIFICANT CHANGE UP (ref 40–120)
ALT FLD-CCNC: 16 U/L — SIGNIFICANT CHANGE UP (ref 10–45)
ANION GAP SERPL CALC-SCNC: 10 MMOL/L — SIGNIFICANT CHANGE UP (ref 5–17)
ANION GAP SERPL CALC-SCNC: 12 MMOL/L — SIGNIFICANT CHANGE UP (ref 5–17)
APTT BLD: 30.2 SEC — SIGNIFICANT CHANGE UP (ref 24.5–35.6)
AST SERPL-CCNC: 51 U/L — HIGH (ref 10–40)
BASE EXCESS BLDV CALC-SCNC: 6.3 MMOL/L — HIGH (ref -2–3)
BASOPHILS # BLD AUTO: 0.04 K/UL — SIGNIFICANT CHANGE UP (ref 0–0.2)
BASOPHILS NFR BLD AUTO: 0.4 % — SIGNIFICANT CHANGE UP (ref 0–2)
BILIRUB SERPL-MCNC: 0.7 MG/DL — SIGNIFICANT CHANGE UP (ref 0.2–1.2)
BUN SERPL-MCNC: 20 MG/DL — SIGNIFICANT CHANGE UP (ref 7–23)
BUN SERPL-MCNC: 23 MG/DL — SIGNIFICANT CHANGE UP (ref 7–23)
CA-I SERPL-SCNC: 1.23 MMOL/L — SIGNIFICANT CHANGE UP (ref 1.15–1.33)
CALCIUM SERPL-MCNC: 8.7 MG/DL — SIGNIFICANT CHANGE UP (ref 8.4–10.5)
CALCIUM SERPL-MCNC: 9.4 MG/DL — SIGNIFICANT CHANGE UP (ref 8.4–10.5)
CHLORIDE BLDV-SCNC: 100 MMOL/L — SIGNIFICANT CHANGE UP (ref 96–108)
CHLORIDE SERPL-SCNC: 100 MMOL/L — SIGNIFICANT CHANGE UP (ref 96–108)
CHLORIDE SERPL-SCNC: 105 MMOL/L — SIGNIFICANT CHANGE UP (ref 96–108)
CO2 BLDV-SCNC: 34 MMOL/L — HIGH (ref 22–26)
CO2 SERPL-SCNC: 24 MMOL/L — SIGNIFICANT CHANGE UP (ref 22–31)
CO2 SERPL-SCNC: 25 MMOL/L — SIGNIFICANT CHANGE UP (ref 22–31)
CREAT SERPL-MCNC: 0.88 MG/DL — SIGNIFICANT CHANGE UP (ref 0.5–1.3)
CREAT SERPL-MCNC: 0.94 MG/DL — SIGNIFICANT CHANGE UP (ref 0.5–1.3)
EGFR: 81 ML/MIN/1.73M2 — SIGNIFICANT CHANGE UP
EGFR: 86 ML/MIN/1.73M2 — SIGNIFICANT CHANGE UP
EOSINOPHIL # BLD AUTO: 0.49 K/UL — SIGNIFICANT CHANGE UP (ref 0–0.5)
EOSINOPHIL NFR BLD AUTO: 5 % — SIGNIFICANT CHANGE UP (ref 0–6)
FLUAV AG NPH QL: SIGNIFICANT CHANGE UP
FLUBV AG NPH QL: SIGNIFICANT CHANGE UP
GAS PNL BLDV: 134 MMOL/L — LOW (ref 136–145)
GAS PNL BLDV: SIGNIFICANT CHANGE UP
GAS PNL BLDV: SIGNIFICANT CHANGE UP
GLUCOSE BLDV-MCNC: 104 MG/DL — HIGH (ref 70–99)
GLUCOSE SERPL-MCNC: 105 MG/DL — HIGH (ref 70–99)
GLUCOSE SERPL-MCNC: 107 MG/DL — HIGH (ref 70–99)
HCO3 BLDV-SCNC: 32 MMOL/L — HIGH (ref 22–29)
HCT VFR BLD CALC: 41.1 % — SIGNIFICANT CHANGE UP (ref 39–50)
HCT VFR BLDA CALC: 41 % — SIGNIFICANT CHANGE UP (ref 39–51)
HGB BLD CALC-MCNC: 13.8 G/DL — SIGNIFICANT CHANGE UP (ref 12.6–17.4)
HGB BLD-MCNC: 13.9 G/DL — SIGNIFICANT CHANGE UP (ref 13–17)
IMM GRANULOCYTES NFR BLD AUTO: 0.3 % — SIGNIFICANT CHANGE UP (ref 0–0.9)
INR BLD: 1 RATIO — SIGNIFICANT CHANGE UP (ref 0.85–1.18)
LACTATE BLDV-MCNC: 1.1 MMOL/L — SIGNIFICANT CHANGE UP (ref 0.5–2)
LYMPHOCYTES # BLD AUTO: 2.8 K/UL — SIGNIFICANT CHANGE UP (ref 1–3.3)
LYMPHOCYTES # BLD AUTO: 28.7 % — SIGNIFICANT CHANGE UP (ref 13–44)
MAGNESIUM SERPL-MCNC: 1.9 MG/DL — SIGNIFICANT CHANGE UP (ref 1.6–2.6)
MAGNESIUM SERPL-MCNC: 2.1 MG/DL — SIGNIFICANT CHANGE UP (ref 1.6–2.6)
MCHC RBC-ENTMCNC: 32.9 PG — SIGNIFICANT CHANGE UP (ref 27–34)
MCHC RBC-ENTMCNC: 33.8 GM/DL — SIGNIFICANT CHANGE UP (ref 32–36)
MCV RBC AUTO: 97.4 FL — SIGNIFICANT CHANGE UP (ref 80–100)
MONOCYTES # BLD AUTO: 0.93 K/UL — HIGH (ref 0–0.9)
MONOCYTES NFR BLD AUTO: 9.5 % — SIGNIFICANT CHANGE UP (ref 2–14)
NEUTROPHILS # BLD AUTO: 5.46 K/UL — SIGNIFICANT CHANGE UP (ref 1.8–7.4)
NEUTROPHILS NFR BLD AUTO: 56.1 % — SIGNIFICANT CHANGE UP (ref 43–77)
NRBC # BLD: 0 /100 WBCS — SIGNIFICANT CHANGE UP (ref 0–0)
NT-PROBNP SERPL-SCNC: 564 PG/ML — HIGH (ref 0–300)
PCO2 BLDV: 51 MMHG — SIGNIFICANT CHANGE UP (ref 42–55)
PH BLDV: 7.41 — SIGNIFICANT CHANGE UP (ref 7.32–7.43)
PHOSPHATE SERPL-MCNC: 2.8 MG/DL — SIGNIFICANT CHANGE UP (ref 2.5–4.5)
PLATELET # BLD AUTO: 216 K/UL — SIGNIFICANT CHANGE UP (ref 150–400)
PO2 BLDV: 17 MMHG — LOW (ref 25–45)
POTASSIUM BLDV-SCNC: 4 MMOL/L — SIGNIFICANT CHANGE UP (ref 3.5–5.1)
POTASSIUM SERPL-MCNC: 3.6 MMOL/L — SIGNIFICANT CHANGE UP (ref 3.5–5.3)
POTASSIUM SERPL-MCNC: 7 MMOL/L — CRITICAL HIGH (ref 3.5–5.3)
POTASSIUM SERPL-SCNC: 3.6 MMOL/L — SIGNIFICANT CHANGE UP (ref 3.5–5.3)
POTASSIUM SERPL-SCNC: 7 MMOL/L — CRITICAL HIGH (ref 3.5–5.3)
PROT SERPL-MCNC: 7.2 G/DL — SIGNIFICANT CHANGE UP (ref 6–8.3)
PROTHROM AB SERPL-ACNC: 11 SEC — SIGNIFICANT CHANGE UP (ref 9.5–13)
RBC # BLD: 4.22 M/UL — SIGNIFICANT CHANGE UP (ref 4.2–5.8)
RBC # FLD: 13.2 % — SIGNIFICANT CHANGE UP (ref 10.3–14.5)
RSV RNA NPH QL NAA+NON-PROBE: SIGNIFICANT CHANGE UP
SAO2 % BLDV: 24.4 % — LOW (ref 67–88)
SARS-COV-2 RNA SPEC QL NAA+PROBE: SIGNIFICANT CHANGE UP
SODIUM SERPL-SCNC: 135 MMOL/L — SIGNIFICANT CHANGE UP (ref 135–145)
SODIUM SERPL-SCNC: 141 MMOL/L — SIGNIFICANT CHANGE UP (ref 135–145)
TROPONIN T, HIGH SENSITIVITY RESULT: 21 NG/L — SIGNIFICANT CHANGE UP (ref 0–51)
TROPONIN T, HIGH SENSITIVITY RESULT: 36 NG/L — SIGNIFICANT CHANGE UP (ref 0–51)
TSH SERPL-MCNC: 4.06 UIU/ML — SIGNIFICANT CHANGE UP (ref 0.27–4.2)
WBC # BLD: 9.75 K/UL — SIGNIFICANT CHANGE UP (ref 3.8–10.5)
WBC # FLD AUTO: 9.75 K/UL — SIGNIFICANT CHANGE UP (ref 3.8–10.5)

## 2024-02-02 PROCEDURE — 84484 ASSAY OF TROPONIN QUANT: CPT

## 2024-02-02 PROCEDURE — 99285 EMERGENCY DEPT VISIT HI MDM: CPT | Mod: 25

## 2024-02-02 PROCEDURE — 93005 ELECTROCARDIOGRAM TRACING: CPT

## 2024-02-02 PROCEDURE — 71275 CT ANGIOGRAPHY CHEST: CPT | Mod: 26,MA

## 2024-02-02 PROCEDURE — 93010 ELECTROCARDIOGRAM REPORT: CPT

## 2024-02-02 PROCEDURE — 82947 ASSAY GLUCOSE BLOOD QUANT: CPT

## 2024-02-02 PROCEDURE — 71045 X-RAY EXAM CHEST 1 VIEW: CPT | Mod: 26

## 2024-02-02 PROCEDURE — 80053 COMPREHEN METABOLIC PANEL: CPT

## 2024-02-02 PROCEDURE — 85018 HEMOGLOBIN: CPT

## 2024-02-02 PROCEDURE — 92960 CARDIOVERSION ELECTRIC EXT: CPT

## 2024-02-02 PROCEDURE — 80048 BASIC METABOLIC PNL TOTAL CA: CPT

## 2024-02-02 PROCEDURE — 87637 SARSCOV2&INF A&B&RSV AMP PRB: CPT

## 2024-02-02 PROCEDURE — 84132 ASSAY OF SERUM POTASSIUM: CPT

## 2024-02-02 PROCEDURE — 36415 COLL VENOUS BLD VENIPUNCTURE: CPT

## 2024-02-02 PROCEDURE — 75572 CT HRT W/3D IMAGE: CPT | Mod: 26

## 2024-02-02 PROCEDURE — 96374 THER/PROPH/DIAG INJ IV PUSH: CPT

## 2024-02-02 PROCEDURE — 99223 1ST HOSP IP/OBS HIGH 75: CPT | Mod: FS

## 2024-02-02 PROCEDURE — 85014 HEMATOCRIT: CPT

## 2024-02-02 PROCEDURE — 82435 ASSAY OF BLOOD CHLORIDE: CPT

## 2024-02-02 PROCEDURE — 83880 ASSAY OF NATRIURETIC PEPTIDE: CPT

## 2024-02-02 PROCEDURE — 82803 BLOOD GASES ANY COMBINATION: CPT

## 2024-02-02 PROCEDURE — 84100 ASSAY OF PHOSPHORUS: CPT

## 2024-02-02 PROCEDURE — 99291 CRITICAL CARE FIRST HOUR: CPT | Mod: GC

## 2024-02-02 PROCEDURE — 71275 CT ANGIOGRAPHY CHEST: CPT | Mod: MA

## 2024-02-02 PROCEDURE — 84443 ASSAY THYROID STIM HORMONE: CPT

## 2024-02-02 PROCEDURE — 85610 PROTHROMBIN TIME: CPT

## 2024-02-02 PROCEDURE — 82330 ASSAY OF CALCIUM: CPT

## 2024-02-02 PROCEDURE — 87040 BLOOD CULTURE FOR BACTERIA: CPT

## 2024-02-02 PROCEDURE — 85730 THROMBOPLASTIN TIME PARTIAL: CPT

## 2024-02-02 PROCEDURE — 85025 COMPLETE CBC W/AUTO DIFF WBC: CPT

## 2024-02-02 PROCEDURE — 71045 X-RAY EXAM CHEST 1 VIEW: CPT

## 2024-02-02 PROCEDURE — 84295 ASSAY OF SERUM SODIUM: CPT

## 2024-02-02 PROCEDURE — 83735 ASSAY OF MAGNESIUM: CPT

## 2024-02-02 PROCEDURE — 75572 CT HRT W/3D IMAGE: CPT

## 2024-02-02 PROCEDURE — 96375 TX/PRO/DX INJ NEW DRUG ADDON: CPT

## 2024-02-02 PROCEDURE — 83605 ASSAY OF LACTIC ACID: CPT

## 2024-02-02 RX ORDER — SIMVASTATIN 20 MG/1
10 TABLET, FILM COATED ORAL AT BEDTIME
Refills: 0 | Status: DISCONTINUED | OUTPATIENT
Start: 2024-02-02 | End: 2024-02-02

## 2024-02-02 RX ORDER — ASPIRIN/CALCIUM CARB/MAGNESIUM 324 MG
1 TABLET ORAL
Qty: 0 | Refills: 0 | DISCHARGE

## 2024-02-02 RX ORDER — SIMVASTATIN 20 MG/1
1 TABLET, FILM COATED ORAL
Qty: 0 | Refills: 0 | DISCHARGE

## 2024-02-02 RX ORDER — LEVOTHYROXINE SODIUM 125 MCG
75 TABLET ORAL ONCE
Refills: 0 | Status: COMPLETED | OUTPATIENT
Start: 2024-02-02 | End: 2024-02-02

## 2024-02-02 RX ORDER — HEPARIN SODIUM 5000 [USP'U]/ML
7000 INJECTION INTRAVENOUS; SUBCUTANEOUS ONCE
Refills: 0 | Status: COMPLETED | OUTPATIENT
Start: 2024-02-02 | End: 2024-02-02

## 2024-02-02 RX ORDER — SENNA PLUS 8.6 MG/1
1 TABLET ORAL
Qty: 0 | Refills: 0 | DISCHARGE

## 2024-02-02 RX ORDER — HEPARIN SODIUM 5000 [USP'U]/ML
7000 INJECTION INTRAVENOUS; SUBCUTANEOUS EVERY 6 HOURS
Refills: 0 | Status: DISCONTINUED | OUTPATIENT
Start: 2024-02-02 | End: 2024-02-02

## 2024-02-02 RX ORDER — HEPARIN SODIUM 5000 [USP'U]/ML
INJECTION INTRAVENOUS; SUBCUTANEOUS
Qty: 25000 | Refills: 0 | Status: DISCONTINUED | OUTPATIENT
Start: 2024-02-02 | End: 2024-02-02

## 2024-02-02 RX ORDER — LISINOPRIL 2.5 MG/1
1 TABLET ORAL
Qty: 30 | Refills: 0
Start: 2024-02-02 | End: 2024-03-02

## 2024-02-02 RX ORDER — METOPROLOL TARTRATE 50 MG
25 TABLET ORAL EVERY 6 HOURS
Refills: 0 | Status: DISCONTINUED | OUTPATIENT
Start: 2024-02-02 | End: 2024-02-02

## 2024-02-02 RX ORDER — SODIUM CHLORIDE 9 MG/ML
1000 INJECTION INTRAMUSCULAR; INTRAVENOUS; SUBCUTANEOUS ONCE
Refills: 0 | Status: COMPLETED | OUTPATIENT
Start: 2024-02-02 | End: 2024-02-02

## 2024-02-02 RX ORDER — DILTIAZEM HCL 120 MG
10 CAPSULE, EXT RELEASE 24 HR ORAL ONCE
Refills: 0 | Status: COMPLETED | OUTPATIENT
Start: 2024-02-02 | End: 2024-02-02

## 2024-02-02 RX ORDER — APIXABAN 2.5 MG/1
1 TABLET, FILM COATED ORAL
Qty: 180 | Refills: 0
Start: 2024-02-02 | End: 2024-05-01

## 2024-02-02 RX ORDER — METOPROLOL TARTRATE 50 MG
5 TABLET ORAL ONCE
Refills: 0 | Status: COMPLETED | OUTPATIENT
Start: 2024-02-02 | End: 2024-02-02

## 2024-02-02 RX ORDER — APIXABAN 2.5 MG/1
1 TABLET, FILM COATED ORAL
Qty: 60 | Refills: 0
Start: 2024-02-02 | End: 2024-03-02

## 2024-02-02 RX ORDER — QUINAPRIL HYDROCHLORIDE 40 MG/1
1 TABLET, FILM COATED ORAL
Qty: 0 | Refills: 0 | DISCHARGE

## 2024-02-02 RX ORDER — HYDROCHLOROTHIAZIDE 25 MG
1 TABLET ORAL
Qty: 0 | Refills: 0 | DISCHARGE

## 2024-02-02 RX ORDER — LEVOTHYROXINE SODIUM 125 MCG
1 TABLET ORAL
Qty: 0 | Refills: 0 | DISCHARGE

## 2024-02-02 RX ORDER — APIXABAN 2.5 MG/1
5 TABLET, FILM COATED ORAL EVERY 12 HOURS
Refills: 0 | Status: DISCONTINUED | OUTPATIENT
Start: 2024-02-02 | End: 2024-02-02

## 2024-02-02 RX ORDER — HEPARIN SODIUM 5000 [USP'U]/ML
3500 INJECTION INTRAVENOUS; SUBCUTANEOUS EVERY 6 HOURS
Refills: 0 | Status: DISCONTINUED | OUTPATIENT
Start: 2024-02-02 | End: 2024-02-02

## 2024-02-02 RX ORDER — LEVOTHYROXINE SODIUM 125 MCG
75 TABLET ORAL DAILY
Refills: 0 | Status: DISCONTINUED | OUTPATIENT
Start: 2024-02-02 | End: 2024-02-02

## 2024-02-02 RX ORDER — METOPROLOL TARTRATE 50 MG
1 TABLET ORAL
Qty: 60 | Refills: 0
Start: 2024-02-02 | End: 2024-04-01

## 2024-02-02 RX ORDER — LISINOPRIL 2.5 MG/1
1 TABLET ORAL
Refills: 0 | DISCHARGE

## 2024-02-02 RX ORDER — METOPROLOL TARTRATE 50 MG
25 TABLET ORAL DAILY
Refills: 0 | Status: DISCONTINUED | OUTPATIENT
Start: 2024-02-02 | End: 2024-02-02

## 2024-02-02 RX ORDER — METOPROLOL TARTRATE 50 MG
5 TABLET ORAL
Refills: 0 | Status: COMPLETED | OUTPATIENT
Start: 2024-02-02 | End: 2024-02-02

## 2024-02-02 RX ADMIN — Medication 5 MILLIGRAM(S): at 12:41

## 2024-02-02 RX ADMIN — HEPARIN SODIUM 7000 UNIT(S): 5000 INJECTION INTRAVENOUS; SUBCUTANEOUS at 05:27

## 2024-02-02 RX ADMIN — HEPARIN SODIUM 1600 UNIT(S)/HR: 5000 INJECTION INTRAVENOUS; SUBCUTANEOUS at 07:05

## 2024-02-02 RX ADMIN — Medication 5 MILLIGRAM(S): at 07:09

## 2024-02-02 RX ADMIN — Medication 5 MILLIGRAM(S): at 06:55

## 2024-02-02 RX ADMIN — SODIUM CHLORIDE 1000 MILLILITER(S): 9 INJECTION INTRAMUSCULAR; INTRAVENOUS; SUBCUTANEOUS at 06:11

## 2024-02-02 RX ADMIN — APIXABAN 5 MILLIGRAM(S): 2.5 TABLET, FILM COATED ORAL at 10:16

## 2024-02-02 RX ADMIN — HEPARIN SODIUM 1600 UNIT(S)/HR: 5000 INJECTION INTRAVENOUS; SUBCUTANEOUS at 05:28

## 2024-02-02 RX ADMIN — Medication 75 MICROGRAM(S): at 08:28

## 2024-02-02 RX ADMIN — SODIUM CHLORIDE 1000 MILLILITER(S): 9 INJECTION INTRAMUSCULAR; INTRAVENOUS; SUBCUTANEOUS at 04:50

## 2024-02-02 RX ADMIN — Medication 25 MILLIGRAM(S): at 19:51

## 2024-02-02 RX ADMIN — APIXABAN 5 MILLIGRAM(S): 2.5 TABLET, FILM COATED ORAL at 19:06

## 2024-02-02 RX ADMIN — Medication 5 MILLIGRAM(S): at 13:05

## 2024-02-02 NOTE — ED PROVIDER NOTE - CLINICAL SUMMARY MEDICAL DECISION MAKING FREE TEXT BOX
83M w/ hx HTN, HLD, hypothyroidism presenting with palpitations. Pt started having palpitations, generally feeling unwell at 1am. Feels a bit better now. Has had 1w of malaise, fatigue. No fever, cp, sob, abd pain, n/v, leg swelling/pain. Pt had cardiology appt 2mo ago with normal echo. Pt in afib w/ rvr, has no hx of afib. No hx blood clot. Drinks alcohol daily. Has been compliant with his meds. Has hx sepsis. Smoked for 25yr, quit 35yr ago. Exam shows alert pt, hypotensive, mentating well. c/f afib w/ rvr from infectious vs. PE. Will get labs, CT, ivf

## 2024-02-02 NOTE — H&P ADULT - NSHPSOCIALHISTORY_GEN_ALL_CORE
Retired [employer] Employee, has 4 grown children. lives with wife  Drinks wine 2-3 glasses daily, denies smoking cigarettes, quit 30 years ago

## 2024-02-02 NOTE — DISCHARGE NOTE PROVIDER - HOSPITAL COURSE
HPI:  The patient is a 83M with h/o HTN, HLD, hypothyroidism, BPH, s/p TURP on self catheterization 3 times daily presenting with palpitations and some chest pressure. He reports having palpitations, some dizziness last couple of days and generally feeling unwell.. Denies fever, urinary burning, SOB, recent travel or any alcohol withdrawal. leg swelling/pain. About  2 months ago he had Echo and was normal echo, never had h/o Irregular heartbeat in the past.  (02 Feb 2024 10:42)    2/2 s/p successful DCCV and will discharge home with eliquis 5 mg BID and toprol 25mg daily. We will also decrease lisinopril to 10mg daily. Discontinue hydrochlorothiazide. Follow up with Electrophysiology Dr. Au and cardiologist Dr. Borrero.    The patient is a 83M with h/o HTN, HLD, hypothyroidism, BPH, s/p TURP on self catheterization 3 times daily presenting with palpitations and some chest pressure. He reports having palpitations, some dizziness last couple of days and generally feeling unwell.. Denies fever, urinary burning, SOB, recent travel or any alcohol withdrawal. leg swelling/pain. About  2 months ago he had Echo and was normal, never had h/o Irregular heartbeat in the past. He was found to have rapid a. fib, was evaluated by EP cardiology. s/p IV and PO metoprolol. SBP was around 108. Started on Eliquis 5mg bid.  The patient underwent successful DCCV on 2/2/24. Rhytm converted to SR with controlled rate. He remained hemodynamically stable and was cleared by EP for discharge. The patient was discharged on Eliquis 5 mg BID and Toprol 25mg daily. Lisinopril was decreased to 10mg daily and hydrochlorothiazide was discontinued. He will follow up with Electrophysiology Dr. Au and cardiologist Dr. Borrero in 1-2 weeks for futher care as outpatient. Wife was given all plans at bedside.

## 2024-02-02 NOTE — DISCHARGE NOTE PROVIDER - NSDCCPCAREPLAN_GEN_ALL_CORE_FT
PRINCIPAL DISCHARGE DIAGNOSIS  Diagnosis: Rapid atrial fibrillation  Assessment and Plan of Treatment: Please follow up with Dr. Au   Atrial fibrillation is the most common heart rhythm problem.  The condition puts you at risk for has stroke and heart attack  It helps if you control your blood pressure, not drink more than 1-2 alcohol drinks per day, cut down on caffeine, getting treatment for over active thyroid gland, and get regular exercise  Call your doctor if you feel your heart racing or beating unusually, chest tightness or pain, lightheaded, faint, shortness of breath especially with exercise  It is important to take your heart medication as prescribed  You may be on anticoagulation which is very important to take as directed      SECONDARY DISCHARGE DIAGNOSES  Diagnosis: Acute hypotension  Assessment and Plan of Treatment: Now resolved. Please decrease your lisinopril to 10mg daily and discontinue hydrochlorothiazide. Reassess with cardiologist    Diagnosis: Hypothyroidism  Assessment and Plan of Treatment: continue synthroid    Diagnosis: HLD (hyperlipidemia)  Assessment and Plan of Treatment: Cholesterol is a substance that is found in the blood. Everyone has some. It is needed for good health. The problem is, people sometimes have too much cholesterol. Compared with people with normal cholesterol, people with high cholesterol have a higher risk of heart attack, stroke, and other health problems. The higher your cholesterol, the higher your risk of these problems.  Not everyone who has high cholesterol needs medicines. Your doctor will decide if you need them based on your age, family history, and other health concerns.  The medicines most often used to treat high cholesterol are called "statins."  You should probably take a statin if you:   - Already had a heart attack or stroke   - Have known heart disease   - Have diabetes   - Have a condition called" peripheral artery disease," which makes it painful to walk, and happens when the arteries in your legs get clogged with fatty deposits   - Have an "abdominal aortic aneurysm," which is a widening of the main artery in the belly  Most people with any of the conditions listed above should take a statin no matter what their cholesterol level is.  If your doctor or nurse prescribes a statin, it's important to keep taking it. The medicine might not make you feel any different. But it can help prevent heart attack, stroke, and death  You can help lower your cholesterol by doing these things:   - You can lower your LDL, or "bad," cholesterol by avoiding red meat, butter, fried foods, cheese, and other foods that have a lot of saturated fat.   - You can lower triglycerides by avoiding sugary foods, fried foods, and excess alcohol.   - If you are overweight, it can help to lose weight. Your doctor or nurse can help you do this in a healthy way.   - Try to get regular physical activity. Even gentle forms of exercise, like walking, are good for your health.  Even if these steps do little to change your cholesterol, they can improve your health in many other ways.

## 2024-02-02 NOTE — ED ADULT NURSE NOTE - OBJECTIVE STATEMENT
84 y/o M A&Ox4 with PMH of hypothyroidism. Pt presents to the ED via EMS c/o palpitations. Per EMS, pt reports having palpitations around 1 am. Per EMS, pt's HR was between 120-180's upon arrival to the scene. Pt reports lethargy, and weakness the past few days; pt endorses feeling lightheaded when he woke up earlier. Pt reports he recently saw his cardiologist recently and "everything was normal." Pt reports his HR usually runs in the "higher 50's and lower 60's." Denies chest pain, SOB, n/v/d, lightheadedness, dizziness, fever, chills. Per EMS, pt was hypotensive upon arrival to scene; IV access established 18 G L AC and pt given 1 L of NS. IV access established; 20 G L wrist. Patient placed on cardiac monitor. Upon arrival to [facility] ED, pt presents in rapid Afib to 150's. MD Lebron and Attending MD Chapa at bedside and assessing patient. Patient safety maintained, bed is in lowest position, wheels locked, and side rails raised.

## 2024-02-02 NOTE — H&P ADULT - NEUROLOGICAL
details… no focal deficit/cranial nerves II-XII intact/sensation intact/responds to verbal commands/deep reflexes intact/cranial nerves intact/superficial reflexes intact

## 2024-02-02 NOTE — ED ADULT NURSE NOTE - NSFALLRISKINTERV_ED_ALL_ED
Assistance with ambulation/Communicate fall risk and risk factors to all staff, patient, and family/Provide visual cue: yellow wristband, yellow gown, etc/Bed in lowest position, wheels locked, appropriate side rails in place/Call bell, personal items and telephone in reach/Instruct patient to call for assistance before getting out of bed/chair/stretcher/Physically safe environment - no spills, clutter or unnecessary equipment/Purposeful Proactive Rounding/Room/bathroom lighting operational, light cord in reach

## 2024-02-02 NOTE — H&P ADULT - HISTORY OF PRESENT ILLNESS
The patient is a 83M with h/o HTN, HLD, hypothyroidism, BPH, s/p TURP on self catheterization 3 times daily presenting with palpitations and some chest pressure. He reports having palpitations, some dizziness last couple of days and generally feeling unwell.. Denies fever, urinary burning, SOB, recent travel or any alcohol withdrawal. leg swelling/pain. About  2 months ago he had Echo and was normal echo, never had h/o Irregular heartbeat in the past.

## 2024-02-02 NOTE — ED ADULT NURSE REASSESSMENT NOTE - NS ED NURSE REASSESS COMMENT FT1
received pt from night shift stable, alerted and oriented x3 with wife at the bedside, fluids running, heparin running rechecked with change of shift. Pt stable, heart rate still elevated, metoprolol given, blood pressure maintained. IV intact. pt connected to a cardiac and respiratory monitor.

## 2024-02-02 NOTE — DISCHARGE NOTE PROVIDER - NSDCMRMEDTOKEN_GEN_ALL_CORE_FT
apixaban 5 mg oral tablet: 1 tab(s) orally every 12 hours  levothyroxine 75 mcg (0.075 mg) oral tablet: 1 tab(s) orally once a day  lisinopril 10 mg oral tablet: 1 tab(s) orally once a day  metoprolol succinate 25 mg oral tablet, extended release: 1 tab(s) orally once a day  OTC: multivitamin, vitamin c, calcium, b-complex:   simvastatin 10 mg oral tablet: 1 tab(s) orally once a day (at bedtime)

## 2024-02-02 NOTE — H&P ADULT - PROBLEM SELECTOR PLAN 4
Has been off of Tamsulosin. Had TURP and on self cath at home  - c/w Self Cath 3 times a day for now

## 2024-02-02 NOTE — DISCHARGE NOTE NURSING/CASE MANAGEMENT/SOCIAL WORK - PATIENT PORTAL LINK FT
You can access the FollowMyHealth Patient Portal offered by [facility] by registering at the following website: [email or web address]. By joining [facility]’s FollowMyHealth portal, you will also be able to view your health information using other applications (apps) compatible with our system.

## 2024-02-02 NOTE — ED PROVIDER NOTE - ATTENDING CONTRIBUTION TO CARE
Hx: pt with h/o HTN, BPH s/p TURP c/b sepsis last year presents via EMS and wife at bedside with palpitations for 3hrs preceded by one week of malaise and fatigue.  Hx from pt, wife, and EMS.  Pt with malaise and fatigue, new weight loss intentional secondary to exercise and diet, recent echo (not FLAVIO) reportedly normal ~2months ago, last physical 8mo ago normal, normal HR 50s, not on rate-control agents.  No priors like this.  No actual fever, cp, sob, cough, vomiting, diarrhea.  Ex-smoker, last use 35yrs ago, ex-25 pack year smoker.  +ETOH use, 1-3 drinks/day, sometimes more.  No h/o withdrawal.     PE: well appearing, nontoxic, no respiratory distress.     Noted on vitals, manual BP 80s, EMS SBP 90s prior, HR 140s-180s afib, Pulse ox 95% --> 92% when talking, RR 18-22.    No JVD.  Clear lungs.  Cardiac no murmur appreciated but limited due to tachycardia.  No peripheral edema.   Skin noted irregular shaped large mole thoracic back. Blueish hypertrophied tip of the nose   Neuro intact, psych normal mood.     MDM: new onset atrial fibrillation with hypotension.  Consider secondary causes including infection, PE, pericardial effusion.  Considered but excluded clinically myxedema, thyroid storm.  check cbc r/o anemia or leukocytosis, check bmp to r/o metabolic derangement and lyte imbalance, troponin, coags, cta-chest r/o PE, CXR, EKG, tele monitoring. Start anticoagulation.  Consider cardioversion if pt becomes more unstable, start IV fluids.  Would not initially cardiovert or rate-control given pt's onset is unclear (probably >48hrs) and possible secondary causes with compensatory mechanism.      CXR films reviewed by me: no infiltrate. Hx: pt with h/o HTN, BPH s/p TURP c/b sepsis last year presents via EMS and wife at bedside with palpitations for 3hrs preceded by one week of malaise and fatigue.  Hx from pt, wife, and EMS.  Pt with malaise and fatigue, new weight loss intentional secondary to exercise and diet, recent echo (not FLAVIO) reportedly normal ~2months ago, last physical 8mo ago normal, normal HR 50s, not on rate-control agents.  No priors like this.  No actual fever, cp, sob, cough, vomiting, diarrhea.  Ex-smoker, last use 35yrs ago, ex-25 pack year smoker.  +ETOH use, 1-3 drinks/day, sometimes more.  No h/o withdrawal.     PE: well appearing, nontoxic, no respiratory distress.     Noted on vitals, manual BP 80s, EMS SBP 90s prior, HR 140s-180s afib, Pulse ox 95% --> 92% when talking, RR 18-22.    No JVD.  Clear lungs.  Cardiac no murmur appreciated but limited due to tachycardia.  No peripheral edema.   Skin noted irregular shaped large mole thoracic back. Blueish hypertrophied tip of the nose   Neuro intact, psych normal mood.     MDM: new onset atrial fibrillation with hypotension.  Consider secondary causes including infection, PE, pericardial effusion.  Considered but excluded clinically myxedema, thyroid storm.  check cbc r/o anemia or leukocytosis, check bmp to r/o metabolic derangement and lyte imbalance, troponin, coags, cta-chest r/o PE, CXR, EKG, tele monitoring. Start anticoagulation.  Consider cardioversion if pt becomes more unstable, start IV fluids.  Would not initially cardiovert or rate-control given pt's onset is unclear (probably >48hrs) and possible secondary causes with compensatory mechanism.      CXR films reviewed by me: no infiltrate.    Progress Note 645a: reviewed labs, CXR, and CT chest.  No PE.  No obvious secondary causes found so far.  BP still low-normal 93/76.  Pt deemed not a candidate for outpatient given risk for further hypotension and need for inpatient eval, treatment, rate control.  Start metoprolol IV, transition to PO, cards and EP consult inpatient.  Will keep on heparin for now in case consultant wants to consider FLAVIO while inpatient.

## 2024-02-02 NOTE — DISCHARGE NOTE PROVIDER - PROVIDER TOKENS
PROVIDER:[TOKEN:[[id number]],FOLLOWUP:[2 weeks],ESTABLISHEDPATIENT:[T]],PROVIDER:[TOKEN:[[id number]],FOLLOWUP:[2 weeks]]

## 2024-02-02 NOTE — ED PROVIDER NOTE - PROGRESS NOTE DETAILS
Sameer PGY3   Patient signed out to me pending reassessment and admission. In summary 83M with history of HTN/HLD and hypothyroidism presents with palpitations, found to be in atrial fibrillation with RVR with HR of 150-160s, received 2x metoprolol 5mg with improvement but fluctuating 110s-140s. On my assessment, patient was intermittently hypotensive to 90s/50s and 80s/60s, maintaining MAP >65. Patient reports that he feels well, denies diaphoresis/lightheadedness. Upon frequent reassessment, patient continues to reports feeling well and resting comfortably in the stretcher, HR 120s-140s and most recent BP to 90s/60s, no indication currently for emergent cardioversion given patient is asymptomatic and maintaining MAP >65. Consulted EP. Will obtain repeat BMP/trop and EKG. Patient placed on zoll pads. Sameer PGY3  Discussed with GAYATHRI HERNANDEZ - recommends medicine admission for further management, plan for FLAVIO/DCCV if able today but may not be able until Monday. Paged hosp for admission.   PMD Raymond Mancia; Es Borrero Sameer PGY3  Hospitalist accepted admission; updated patient and wife at bedside.

## 2024-02-02 NOTE — DISCHARGE NOTE PROVIDER - CARE PROVIDER_API CALL
Phan Borrero  Cardiovascular Disease  [address] [place] [address]  Phone: [phone]  Fax: [phone]  Established Patient  Follow Up Time: 2 weeks    Glenn Au  Cardiac Electrophysiology  [address] [place] [id number]  Phone: [phone]  Fax: [phone]  Follow Up Time: 2 weeks

## 2024-02-02 NOTE — ED PROVIDER NOTE - OBJECTIVE STATEMENT
83M w/ hx HTN, HLD, hypothyroidism presenting with palpitations. Pt started having palpitations, generally feeling unwell at 1am. Feels a bit better now. Has had 1w of malaise, fatigue. No fever, cp, sob, abd pain, n/v, leg swelling/pain. Pt had cardiology appt 2mo ago with normal echo. Pt in afib w/ rvr, has no hx of afib. No hx blood clot. Drinks alcohol daily. Has been compliant with his meds. Has hx sepsis. Smoked for 25yr, quit 35yr ago.

## 2024-02-02 NOTE — H&P ADULT - PROBLEM SELECTOR PLAN 1
New atrial fibrillation with RVR around 150s, s/p 2 doses IV metoprolol 5mg each and PO metoprolol 25mg.  - CTA chest neg PE, CXR clear, Labs including TSH, WBC, SCr and Trops are unremarkable  - Appreciated EP plans- DCCV after CT ANNELIESE   - c/w Eliquis 5mg bid and Metoprolol PO and IV if SBP >90  - Echo, Tele monitoring  ** Plan is d/w his wife at bedside

## 2024-02-02 NOTE — DISCHARGE NOTE PROVIDER - NSDCCPTREATMENT_GEN_ALL_CORE_FT
PRINCIPAL PROCEDURE  Procedure: External cardioversion  Findings and Treatment: s/p successful DCCV. Start Toprol 25mg daily

## 2024-02-02 NOTE — CONSULT NOTE ADULT - SUBJECTIVE AND OBJECTIVE BOX
HISTORY OF PRESENT ILLNESS: 84 y/o man with HTN, HLD, hypothyroidism and BPH s/p TURP (10/2022) resulting in sepsis and ultimately still requires self cath 3x/day who presented to the ED this morning with palpitations, dizziness and fatigue. He reports a few day history of fatigue and dizziness, then at about 1am today he developed palpitations prompting ED visit. Upon arrival he was found to be in new TITO 150-170s s/p IV metoprolol x2 but has been limited by hypotension. He currently feels well and denies any CP, SOB, dizziness, LH, palpitations, presyncope or syncope.    Allergies  No Known Allergies    	  MEDICATIONS:  heparin   Injectable 3500 Unit(s) IV Push every 6 hours PRN  heparin   Injectable 7000 Unit(s) IV Push every 6 hours PRN  heparin  Infusion.  Unit(s)/Hr IV Continuous <Continuous>  metoprolol tartrate 25 milliGRAM(s) Oral every 6 hours PRN  metoprolol tartrate Injectable 5 milliGRAM(s) IV Push every 15 minutes PRN          PAST MEDICAL & SURGICAL HISTORY:  Hypothyroidism      HTN (hypertension)      HLD (hyperlipidemia)      History of urinary retention      History of appendectomy  childhood      H/O carpal tunnel repair  right          FAMILY HISTORY:  No pertinent family history in first degree relatives        SOCIAL HISTORY:    2/3 drinks/day (wine/vodka)  Former smoker - quit 37 yrs ago (25 pk/yr history)      REVIEW OF SYSTEMS:  See HPI. Otherwise, 12 point ROS done and otherwise negative.    PHYSICAL EXAM:  T(C): 36.4 (02-02-24 @ 07:13), Max: 36.4 (02-02-24 @ 04:56)  HR: 127 (02-02-24 @ 08:34) (127 - 155)  BP: 104/84 (02-02-24 @ 08:34) (86/- - 113/80)  RR: 18 (02-02-24 @ 08:34) (16 - 20)  SpO2: 97% (02-02-24 @ 08:34) (92% - 98%)  Wt(kg): --  I&O's Summary      Appearance: Normal	  HEENT:  PERRL, EOMI	  Cardiovascular: Normal S1 S2, irreg, tachy, No JVD, No murmurs, No edema  Respiratory: Lungs clear to auscultation	  Psychiatry: A & O x 3, Mood & affect appropriate  Gastrointestinal:  Soft, Non-tender, + BS	  Skin: No rashes, No ecchymoses, No cyanosis	  Neurologic: Non-focal  Extremities: No clubbing, cyanosis or edema  Vascular: Peripheral pulses palpable 2+ bilaterally        LABS:	 	    CBC Full  -  ( 02 Feb 2024 04:52 )  WBC Count : 9.75 K/uL  Hemoglobin : 13.9 g/dL  Hematocrit : 41.1 %  Platelet Count - Automated : 216 K/uL  Mean Cell Volume : 97.4 fl  Mean Cell Hemoglobin : 32.9 pg  Mean Cell Hemoglobin Concentration : 33.8 gm/dL  Auto Neutrophil # : 5.46 K/uL  Auto Lymphocyte # : 2.80 K/uL  Auto Monocyte # : 0.93 K/uL  Auto Eosinophil # : 0.49 K/uL  Auto Basophil # : 0.04 K/uL  Auto Neutrophil % : 56.1 %  Auto Lymphocyte % : 28.7 %  Auto Monocyte % : 9.5 %  Auto Eosinophil % : 5.0 %  Auto Basophil % : 0.4 %    02-02    141  |  105  |  20  ----------------------------<  105<H>  3.6   |  24  |  0.88  02-02    135  |  100  |  23  ----------------------------<  107<H>  7.0<HH>   |  25  |  0.94    Ca    8.7      02 Feb 2024 07:36  Ca    9.4      02 Feb 2024 04:52  Phos  2.8     02-02  Mg     1.9     02-02  Mg     2.1     02-02    TPro  7.2  /  Alb  4.2  /  TBili  0.7  /  DBili  x   /  AST  51<H>  /  ALT  16  /  AlkPhos  43  02-02      TSH: Thyroid Stimulating Hormone, Serum: 4.06 uIU/mL (02-02 @ 04:52)        TELEMETRY: -170s	      Echo (11/17/23):  EF 60%  LA 4.49 cm  trace MR

## 2024-02-02 NOTE — DISCHARGE NOTE NURSING/CASE MANAGEMENT/SOCIAL WORK - NSDCPEFALRISK_GEN_ALL_CORE
For information on Fall & Injury Prevention, visit: [email or web address] OR  [email or web address] OR  https://www.cdc.gov/steadi/patient.html

## 2024-02-02 NOTE — ED ADULT NURSE NOTE - CHPI ED NUR SYMPTOMS NEG
[] : Resident
no chest pain/no chills/no diaphoresis/no dizziness/no fever/no nausea/no shortness of breath/no vomiting

## 2024-02-02 NOTE — H&P ADULT - ASSESSMENT
The patient is a 83M with h/o HTN, HLD, hypothyroidism, BPH, s/p TURP on self catheterization 3 times daily presenting with palpitations and some chest pressure. He reports having palpitations, some dizziness last couple of days and generally feeling unwell.. Denies fever, urinary burning, SOB, recent travel or any alcohol withdrawal. leg swelling/pain. About  2 months ago he had Echo and was normal echo, never had h/o Irregular heartbeat in the past.     In ED he was found to have a. fib with RVR 150s, got IV metoprolol 5mg x 2, PO Metoprolol 25mg x 1. CTA chest neg PE, CXR clear, Labs and Trops are unremarkable. EP cardiology evaluated.

## 2024-02-02 NOTE — ED PROVIDER NOTE - PHYSICAL EXAMINATION
General appearance: NAD, conversant, afebrile    Eyes: anicteric sclerae, DEIV, EOMI   HENT: Atraumatic; oropharynx clear, MMM and no ulcerations, no pharyngeal erythema or exudate   Neck: Trachea midline; Full range of motion, supple   Pulm: CTA bl, normal respiratory effort and no intercostal retractions, normal work of breathing   CV: afib w/ rvr, hypotensive.   Abdomen: Soft, non-tender, non-distended; no guarding or rebound   Extremities: No peripheral edema or extremity lymphadenopathy. 5/5 strength in all four extremities.   Skin: Dry, normal temperature, turgor and texture; no rash, ulcers or subcutaneous nodules   Psych: Appropriate affect, cooperative; alert and oriented to person, place and time

## 2024-02-02 NOTE — CONSULT NOTE ADULT - ASSESSMENT
84 y/o man with HTN, HLD, hypothyroidism and BPH s/p TURP (10/2022) resulting in sepsis and ultimately still requires self cath 3x/day who presented to the ED this morning with palpitations, dizziness and fatigue. He reports a few day history of fatigue and dizziness, then at about 1am today he developed palpitations prompting ED visit. Upon arrival he was found to be in new TIOT.    1. New TITO    - -140s currently  - Start Eliquis 5 mg Q12H now (Weight>60kg, Cr<1.5)  - CT Left atrium ordered to r/o ANNELIESE thrombus  - If no thrombus will plan for DCCV today and possible AAD initiation  - Keep NPO  - Consider outpatient ablation  - Close telemetry monitoring  - Maintain K>4.0 and Mg>2.0 82 y/o man with HTN, HLD, hypothyroidism and BPH s/p TURP (10/2022) resulting in sepsis and ultimately still requires self cath 3x/day who presented to the ED this morning with palpitations, dizziness and fatigue. He reports a few day history of fatigue and dizziness, then at about 1am today he developed palpitations prompting ED visit. Upon arrival he was found to be in new TITO.    1. New TITO    - -140s currently  - Start Eliquis 5 mg Q12H now (Weight>60kg, Cr<1.5)  - CT Left atrium ordered to r/o ANNELIESE thrombus  - If no thrombus will plan for DCCV today and possible AAD initiation  - TTE pending  - Keep NPO  - Consider outpatient ablation  - Close telemetry monitoring  - Maintain K>4.0 and Mg>2.0

## 2024-02-02 NOTE — DISCHARGE NOTE PROVIDER - CARE PROVIDERS DIRECT ADDRESSES
[email or web address]

## 2024-02-02 NOTE — ED ADULT NURSE REASSESSMENT NOTE - NS ED NURSE REASSESS COMMENT FT1
as per MD order, Heparin discontinued, Eliquis given orally. Pt remains alerted and oriented x3, no signs of acute distress noted at this moment. MAP 75 on the blood pressure, heart rate 135

## 2024-02-02 NOTE — ED ADULT TRIAGE NOTE - INTERNATIONAL TRAVEL
"Chief Complaint   Patient presents with     Hypertension       Initial /80  Pulse 82  Temp 97.9  F (36.6  C) (Tympanic)  Wt 270 lb (122.5 kg)  SpO2 95%  BMI 50.68 kg/m2 Estimated body mass index is 50.68 kg/(m^2) as calculated from the following:    Height as of 3/14/18: 5' 1.2\" (1.554 m).    Weight as of this encounter: 270 lb (122.5 kg).  Medication Reconciliation: complete    Current Outpatient Prescriptions   Medication Sig Dispense Refill     amLODIPine (NORVASC) 5 MG tablet Take 1 tablet (5 mg) by mouth daily 30 tablet 3     lisinopril (PRINIVIL/ZESTRIL) 20 MG tablet Take 1 tablet (20 mg) by mouth daily 30 tablet 1     hydrochlorothiazide (HYDRODIURIL) 25 MG tablet Take 1 tablet (25 mg) by mouth daily 30 tablet 1     prochlorperazine (COMPAZINE) 10 MG tablet Take 1 tablet (10 mg) by mouth every 8 hours as needed for nausea or other (headache not responsive to tylenol alone) (Patient not taking: Reported on 3/29/2018) 5 tablet 0       Marquis M, CMA  " No

## 2024-02-03 ENCOUNTER — NON-APPOINTMENT (OUTPATIENT)
Age: 84
End: 2024-02-03

## 2024-02-05 ENCOUNTER — NON-APPOINTMENT (OUTPATIENT)
Age: 84
End: 2024-02-05

## 2024-02-05 ENCOUNTER — APPOINTMENT (OUTPATIENT)
Dept: CARDIOLOGY | Facility: CLINIC | Age: 84
End: 2024-02-05
Payer: MEDICARE

## 2024-02-05 VITALS
TEMPERATURE: 98 F | HEART RATE: 54 BPM | BODY MASS INDEX: 25.73 KG/M2 | WEIGHT: 190 LBS | HEIGHT: 72 IN | SYSTOLIC BLOOD PRESSURE: 163 MMHG | OXYGEN SATURATION: 98 % | DIASTOLIC BLOOD PRESSURE: 85 MMHG

## 2024-02-05 VITALS — SYSTOLIC BLOOD PRESSURE: 155 MMHG | DIASTOLIC BLOOD PRESSURE: 80 MMHG

## 2024-02-05 PROCEDURE — 99496 TRANSJ CARE MGMT HIGH F2F 7D: CPT

## 2024-02-05 PROCEDURE — 93000 ELECTROCARDIOGRAM COMPLETE: CPT

## 2024-02-05 NOTE — PHYSICAL EXAM

## 2024-02-05 NOTE — HISTORY OF PRESENT ILLNESS
[FreeTextEntry1] : 83M with HTN, HLD for follow up after admission for Afib with RVR, s/p DCCV  Pt had been feeling malaise and fatigue x a few days Then woke up one morning feeling unwell, lightheaded, like he was going to pass out Went to [facility] ER was in AFib with RVR S/p FLAVIO/CV, started on Toprol, Eliquis  HCTZ d/c'ed, lisinopril dose dropped Some fatigue since d/c, also headaches, notes his BP has been running higher since d/c   Former smoker, quit in his 40s. Retired- worked in a bank. Lives with wife  ECG: SR with possible LVH TTE: 69%, normal valves EST: 12/2020- 8:00 mins, no ischemia, HRmax 150 bpm TTE 2023:  1. Left ventricular cavity is normal. Left ventricular wall thickness is normal. Left ventricular systolic function is normal with an ejection fraction of 66 % by Handy's method of disks. There are no regional wall motion abnormalities seen. 2. Normal left ventricular diastolic function, with normal filling pressure. 3. Normal right ventricular cavity size, wall thickness, and systolic function. Tricuspid annular plane systolic excursion (TAPSE) is 2.0 cm (normal >=1.7 cm). 4. The left atrium is normal. 5. Fibrocalcific aortic valve sclerosis without stenosis.  Eliquis 5mg BID  Simvastatin 10mg   Lisinopril 10mg (increase to 20mg) Toprol 25mg (drop to 12.5mg) (Add back HCTZ 25mg)

## 2024-02-05 NOTE — DISCUSSION/SUMMARY
[FreeTextEntry1] : PAF: S/p CV. Continue Eliquis. Drop Toprol to 12.5mg given bradycardia. EP eval pending   Hypertension: BP running higher today. Meds were d/c'ed in hospital. Will resume lisinopril 20mg, HCTZ 25mg.  Hyperlipidemia: Lipids excellent.  Continue simvastatin.  Normal AST/ALT  RV 3M

## 2024-02-07 ENCOUNTER — NON-APPOINTMENT (OUTPATIENT)
Age: 84
End: 2024-02-07

## 2024-02-07 LAB
CULTURE RESULTS: SIGNIFICANT CHANGE UP
CULTURE RESULTS: SIGNIFICANT CHANGE UP
SPECIMEN SOURCE: SIGNIFICANT CHANGE UP
SPECIMEN SOURCE: SIGNIFICANT CHANGE UP

## 2024-02-08 ENCOUNTER — APPOINTMENT (OUTPATIENT)
Dept: FAMILY MEDICINE | Facility: CLINIC | Age: 84
End: 2024-02-08
Payer: MEDICARE

## 2024-02-08 VITALS
HEART RATE: 50 BPM | HEIGHT: 72 IN | WEIGHT: 189 LBS | BODY MASS INDEX: 25.6 KG/M2 | TEMPERATURE: 97.6 F | DIASTOLIC BLOOD PRESSURE: 82 MMHG | SYSTOLIC BLOOD PRESSURE: 130 MMHG | RESPIRATION RATE: 16 BRPM | OXYGEN SATURATION: 97 %

## 2024-02-08 PROCEDURE — 36415 COLL VENOUS BLD VENIPUNCTURE: CPT

## 2024-02-08 PROCEDURE — 99214 OFFICE O/P EST MOD 30 MIN: CPT | Mod: 25

## 2024-02-08 NOTE — REVIEW OF SYSTEMS
[Negative] : Gastrointestinal [Fever] : no fever [Chills] : no chills [Chest Pain] : no chest pain [Palpitations] : no palpitations [Shortness Of Breath] : no shortness of breath [Wheezing] : no wheezing [Dizziness] : no dizziness [de-identified] : slight headache, eats once a day in afternoon.

## 2024-02-08 NOTE — PHYSICAL EXAM
[No Acute Distress] : no acute distress [Well-Appearing] : well-appearing [Normal Oropharynx] : the oropharynx was normal [No Lymphadenopathy] : no lymphadenopathy [No Edema] : there was no peripheral edema [No Extremity Clubbing/Cyanosis] : no extremity clubbing/cyanosis [Normal] : affect was normal and insight and judgment were intact

## 2024-02-08 NOTE — PLAN
[FreeTextEntry1] : Hospital Discharge and Cardiology note reviewed.  BP improved today. Patient feeling well.  Will check Lipids and A1c.   Seeing EP tomorrow. Keep BP log.  Advised to bring home machine in with him to next doctor's visit. Advised of home BP positioning. Will adjust meds based on labs.  Patient expressed understanding of plan.

## 2024-02-08 NOTE — HISTORY OF PRESENT ILLNESS
[FreeTextEntry1] : Here for HFU [de-identified] : Here for HFU  [place] dizzy last Friday. Felt very faint. Tried to check BP. -BP was very low. Went by EMS.  Found to be in afib in hospital and hypotensive.  Was converted.  Now on eliquis.  Has been feeling well since discharge.   Saw Cardio earlier this week. BP meds adjusted. Has 2 BP cuffs at home was getting some elevated readings. Will check when he gets home.      Usually 1 big meal.

## 2024-02-09 ENCOUNTER — APPOINTMENT (OUTPATIENT)
Dept: ELECTROPHYSIOLOGY | Facility: CLINIC | Age: 84
End: 2024-02-09
Payer: MEDICARE

## 2024-02-09 ENCOUNTER — NON-APPOINTMENT (OUTPATIENT)
Age: 84
End: 2024-02-09

## 2024-02-09 VITALS — HEART RATE: 57 BPM | OXYGEN SATURATION: 98 % | SYSTOLIC BLOOD PRESSURE: 138 MMHG | DIASTOLIC BLOOD PRESSURE: 74 MMHG

## 2024-02-09 PROCEDURE — 95800 SLP STDY UNATTENDED: CPT | Mod: TC

## 2024-02-09 PROCEDURE — 93000 ELECTROCARDIOGRAM COMPLETE: CPT | Mod: 59

## 2024-02-09 PROCEDURE — 99215 OFFICE O/P EST HI 40 MIN: CPT

## 2024-02-09 NOTE — DISCUSSION/SUMMARY
[FreeTextEntry1] : Mr. Christofer Gutierrez is an 83-year-old man with hypertension, hyperlipidemia, benign prostatic hyperplasia (status post a transurethral prostate resection in October of 2022, still self-catheterizing) and atrial fibrillation. He presents today for follow up after undergoing a direct current cardioversion for symptomatic atrial fibrillation on February 2nd. 2024.  We discussed the following management strategies: 1) observation and ventricular rate control only in the event of a recurrence of atrial fibrillation, 2) antiarrhythmic drugs to suppress AF, or 3) catheter ablation of the AF. I highlighted the importance of risk factor modification, including assessment and treatment of possible sleep apnea (I ordered a home sleep study today), managing stress / weight, and limiting alcohol/caffeine intake. Given that his atrial fibrillation started after he drank four drinks of alcohol, I strongly encouraged him to reduce his alcohol intake.   We discussed that antiarrhythmic drug therapy is expected to reduce AF recurrences by approximately 50% in all-comers, although this number may be higher or lower for individual patients and trials of multiple drugs may be necessary to find the optimal medications. Side effects of these medications including the risk of proarrhythmic effects were reviewed.     We reviewed the risks and benefits of catheter ablation. The procedure would likely result in a significant reduction in AF burden, with the chances of AF remission following a single procedure at approximately 80% at 1 year. In the long-term, risk factor modification, repeat ablation, and/or antiarrhythmic drug therapy may be necessary to suppress AF recurrences due to the complex nature of AF mechanisms and sometimes new triggers or drivers of AF. I discussed the fact that ablation is usually reserved for patients whose AF is resistant to medications, but that some patients choose to undergo primary ablation due to various considerations - including the preference to avoid medication side effects. I also explained that in addition to complications related to anesthesia, the ablation procedure carries a 1% risk of complications including, but not limited to: vascular injury, VTE, MI, cardiac perforation, pulmonary vein stenosis, esophageal injury, or phrenic nerve injury. There is also a 0.2% risk of a potentially catastrophic complication including stroke or even death. We also reviewed the need for anticoagulation both before and after the ablation.   Given that this is his first episode of atrial fibrillation (and this episode may have been triggered by alcohol use) I encouraged him to continue to monitor himself for recurrences of atrial fibrillation in the coming months. If his burden of atrial fibrillation increases, I would recommend a catheter ablation.  [EKG obtained to assist in diagnosis and management of assessed problem(s)] : EKG obtained to assist in diagnosis and management of assessed problem(s)

## 2024-02-09 NOTE — REVIEW OF SYSTEMS
[Negative] : Heme/Lymph [FreeTextEntry5] : see HPI [FreeTextEntry8] : No hematuria, the patient self-catheterizes

## 2024-02-09 NOTE — HISTORY OF PRESENT ILLNESS
[FreeTextEntry1] : Mr. Christofer Gutierrez is an 83-year-old man with hypertension, hyperlipidemia, benign prostatic hyperplasia (status post a transurethral prostate resection in October of 2022, still self-catheterizing) and atrial fibrillation. He presents today for follow up after undergoing a direct current cardioversion for symptomatic atrial fibrillation on February 2nd. 2024.  The patient presented to [facility] on February 2nd, 2024 with four days of symptoms. His symptoms included dizziness, pre-syncope, palpitations and fatigue. These symptoms started after he drank four drinks on the night prior. He drinks on average 1-2 drinks per night. After excluding a left atrial appendage thrombus using a CT of the left atrium, the patient underwent a direct current cardioversion. Since leaving the hospital he reports that he has been feeling well. He was mildly tired for a few days. He remains active and exercises on a daily basis.  CHADS2-VASC: 3 (Age: 2, HTN: 1)

## 2024-02-09 NOTE — CARDIOLOGY SUMMARY
[de-identified] : ECG from 2/9/2024: Sinus rhythm at 68bpm with first-degree AV delay (OR: 210ms) ECG from 2/2/2024: AF at 135bpm [de-identified] : TTE from 11/17/2023: EF: 66%, normal LV diastolic function, normal RV size and systolic function, normal LA and RA, trace MR, trace TR, normal pericardial effusion [de-identified] : CT Heart LA from 2/2/2024: LA is enlarged, no ANNELIESE thrombus, coronary calcium score 878 [de-identified] : DCCV from 2/2/2024: Single 200j DCCV for AF

## 2024-02-11 LAB
ALBUMIN SERPL ELPH-MCNC: 4.6 G/DL
ALP BLD-CCNC: 56 U/L
ALT SERPL-CCNC: 11 U/L
ANION GAP SERPL CALC-SCNC: 11 MMOL/L
AST SERPL-CCNC: 19 U/L
BILIRUB SERPL-MCNC: 0.6 MG/DL
BUN SERPL-MCNC: 18 MG/DL
CALCIUM SERPL-MCNC: 10.4 MG/DL
CHLORIDE SERPL-SCNC: 97 MMOL/L
CHOLEST SERPL-MCNC: 174 MG/DL
CO2 SERPL-SCNC: 28 MMOL/L
CREAT SERPL-MCNC: 0.94 MG/DL
EGFR: 81 ML/MIN/1.73M2
ESTIMATED AVERAGE GLUCOSE: 103 MG/DL
GLUCOSE SERPL-MCNC: 92 MG/DL
HBA1C MFR BLD HPLC: 5.2 %
HDLC SERPL-MCNC: 67 MG/DL
LDLC SERPL CALC-MCNC: 97 MG/DL
NONHDLC SERPL-MCNC: 107 MG/DL
POTASSIUM SERPL-SCNC: 5.1 MMOL/L
PROT SERPL-MCNC: 7.2 G/DL
SODIUM SERPL-SCNC: 136 MMOL/L
TRIGL SERPL-MCNC: 50 MG/DL

## 2024-02-26 ENCOUNTER — RX RENEWAL (OUTPATIENT)
Age: 84
End: 2024-02-26

## 2024-04-09 RX ORDER — APIXABAN 5 MG/1
5 TABLET, FILM COATED ORAL
Qty: 180 | Refills: 1 | Status: ACTIVE | COMMUNITY
Start: 1900-01-01 | End: 1900-01-01

## 2024-04-09 RX ORDER — METOPROLOL SUCCINATE 25 MG/1
25 TABLET, EXTENDED RELEASE ORAL DAILY
Qty: 90 | Refills: 1 | Status: ACTIVE | COMMUNITY
Start: 2024-02-02 | End: 1900-01-01

## 2024-04-12 RX ORDER — LISINOPRIL 20 MG/1
20 TABLET ORAL
Qty: 90 | Refills: 0 | Status: ACTIVE | COMMUNITY
Start: 2023-01-05 | End: 1900-01-01

## 2024-05-20 ENCOUNTER — APPOINTMENT (OUTPATIENT)
Dept: CARDIOLOGY | Facility: CLINIC | Age: 84
End: 2024-05-20
Payer: MEDICARE

## 2024-05-20 ENCOUNTER — NON-APPOINTMENT (OUTPATIENT)
Age: 84
End: 2024-05-20

## 2024-05-20 VITALS
WEIGHT: 186 LBS | SYSTOLIC BLOOD PRESSURE: 137 MMHG | DIASTOLIC BLOOD PRESSURE: 77 MMHG | OXYGEN SATURATION: 100 % | BODY MASS INDEX: 25.23 KG/M2 | HEART RATE: 52 BPM

## 2024-05-20 DIAGNOSIS — E78.5 HYPERLIPIDEMIA, UNSPECIFIED: ICD-10-CM

## 2024-05-20 DIAGNOSIS — I10 ESSENTIAL (PRIMARY) HYPERTENSION: ICD-10-CM

## 2024-05-20 DIAGNOSIS — I48.0 PAROXYSMAL ATRIAL FIBRILLATION: ICD-10-CM

## 2024-05-20 PROCEDURE — 99214 OFFICE O/P EST MOD 30 MIN: CPT

## 2024-05-20 PROCEDURE — 93000 ELECTROCARDIOGRAM COMPLETE: CPT

## 2024-05-20 PROCEDURE — G2211 COMPLEX E/M VISIT ADD ON: CPT

## 2024-05-20 RX ORDER — LEVOTHYROXINE SODIUM 0.07 MG/1
75 TABLET ORAL
Qty: 90 | Refills: 0 | Status: ACTIVE | COMMUNITY
Start: 2022-06-13 | End: 1900-01-01

## 2024-05-20 NOTE — PHYSICAL EXAM

## 2024-05-20 NOTE — DISCUSSION/SUMMARY
[FreeTextEntry1] : PAF: No further symptoms of Afib. Continue Eliquis, Toprol to 12.5mg. Sinus jhony, longstanding. Check ECG. No symptoms. Saw EP, holding off on ablation given single episode of AFib  Hypertension: BP borderline but acceptable. Continue lisinopril 20mg, HCTZ 25mg. Low BP reading likely secondary to dehydration, increase hydration.   Hyperlipidemia: Lipids good.  Continue simvastatin.  Normal AST/ALT. Room to increase   RV 4M  [EKG obtained to assist in diagnosis and management of assessed problem(s)] : EKG obtained to assist in diagnosis and management of assessed problem(s)

## 2024-05-20 NOTE — HISTORY OF PRESENT ILLNESS
[FreeTextEntry1] : 84M with HTN, HLD, PAF   Very active lately, doing yardwork BP has been good at home Had an episode a last week, did strenuous work outside for several hours, subsequently noted his BP was low for a bit, no symptoms BP subsequently came back up to normal Tolerating Eliquis without bleeding   HISTORY:   Pt had been feeling malaise and fatigue x a few days Then woke up one morning feeling unwell, lightheaded, like he was going to pass out Went to [facility] ER was in AFib with RVR S/p FLAVIO/CV, started on Toprol, Eliquis  May have been triggered by alcohol   Former smoker, quit in his 40s. Retired- worked in a bank. Lives with wife  ECG: SR with possible LVH TTE: 69%, normal valves EST: 12/2020- 8:00 mins, no ischemia, HRmax 150 bpm TTE 2023:  1. Left ventricular cavity is normal. Left ventricular wall thickness is normal. Left ventricular systolic function is normal with an ejection fraction of 66 % by Handy's method of disks. There are no regional wall motion abnormalities seen. 2. Normal left ventricular diastolic function, with normal filling pressure. 3. Normal right ventricular cavity size, wall thickness, and systolic function. Tricuspid annular plane systolic excursion (TAPSE) is 2.0 cm (normal >=1.7 cm). 4. The left atrium is normal. 5. Fibrocalcific aortic valve sclerosis without stenosis.  Eliquis 5mg BID  Simvastatin 10mg   Lisinopril 20mg Toprol 12.5mg HCTZ 25mg

## 2024-06-18 ENCOUNTER — APPOINTMENT (OUTPATIENT)
Dept: FAMILY MEDICINE | Facility: CLINIC | Age: 84
End: 2024-06-18
Payer: MEDICARE

## 2024-06-18 ENCOUNTER — LABORATORY RESULT (OUTPATIENT)
Age: 84
End: 2024-06-18

## 2024-06-18 VITALS
TEMPERATURE: 97.7 F | DIASTOLIC BLOOD PRESSURE: 78 MMHG | BODY MASS INDEX: 25.47 KG/M2 | HEART RATE: 53 BPM | WEIGHT: 188 LBS | SYSTOLIC BLOOD PRESSURE: 138 MMHG | OXYGEN SATURATION: 99 % | RESPIRATION RATE: 16 BRPM | HEIGHT: 72 IN

## 2024-06-18 DIAGNOSIS — L60.3 NAIL DYSTROPHY: ICD-10-CM

## 2024-06-18 DIAGNOSIS — G62.9 POLYNEUROPATHY, UNSPECIFIED: ICD-10-CM

## 2024-06-18 DIAGNOSIS — H91.93 UNSPECIFIED HEARING LOSS, BILATERAL: ICD-10-CM

## 2024-06-18 PROCEDURE — 36415 COLL VENOUS BLD VENIPUNCTURE: CPT

## 2024-06-18 PROCEDURE — G0439: CPT

## 2024-06-18 NOTE — HEALTH RISK ASSESSMENT
[Yes] : Yes [4 or more  times a week (4 pts)] : 4 or more  times a week (4 points) [No falls in past year] : Patient reported no falls in the past year [Former] : Former [> 15 Years] : > 15 Years [HIV test declined] : HIV test declined [Hepatitis C test declined] : Hepatitis C test declined [# of Members in Household ___] :  household currently consist of [unfilled] member(s) [] :  [de-identified] : Urology, Cardiology, Derm [de-identified] : 1-2 drinks with dinner  [de-identified] : Varied diet; no red meat [de-identified] : Quit 30 years ago [de-identified] : difficulty with back ground noise-asking to repeat [de-identified] : Reading glasses-eye exam Winter 2024 [de-identified] : Dentist every 6 months

## 2024-06-18 NOTE — PLAN
[FreeTextEntry1] : HTN-BP acceptable today; continue current medications.  Check CMP, Lipid panel. Afib on Eliquis-following with Cardiology.   Hearing Difficulty discussed ENT eval.   Skin Lump-check sonogram-possible resolving hematoma. Elevate feet. No sign of active infection. Dystrophic Nails-discussed Podiatry evaluation.  Will adjust meds based on labs.  Patient expressed understanding of plan.

## 2024-06-18 NOTE — REVIEW OF SYSTEMS
[Hearing Loss] : hearing loss [Negative] : Gastrointestinal [Fever] : no fever [Chills] : no chills [Earache] : no earache [Nasal Discharge] : no nasal discharge [Sore Throat] : no sore throat [Chest Pain] : no chest pain [Palpitations] : no palpitations [Shortness Of Breath] : no shortness of breath [Wheezing] : no wheezing [Cough] : no cough [Hematuria] : no hematuria [Headache] : no headache [Dizziness] : no dizziness [FreeTextEntry4] : no clicking or popping [FreeTextEntry8] : self cath [de-identified] : 5 hours of sleep per night; interrupted sleep

## 2024-06-18 NOTE — PHYSICAL EXAM
[No Acute Distress] : no acute distress [Well-Appearing] : well-appearing [Normal Sclera/Conjunctiva] : normal sclera/conjunctiva [PERRL] : pupils equal round and reactive to light [Normal Oropharynx] : the oropharynx was normal [Normal TMs] : both tympanic membranes were normal [No Lymphadenopathy] : no lymphadenopathy [Thyroid Normal, No Nodules] : the thyroid was normal and there were no nodules present [No Edema] : there was no peripheral edema [No Extremity Clubbing/Cyanosis] : no extremity clubbing/cyanosis [Normal] : soft, non-tender, non-distended, no masses palpated, no HSM and normal bowel sounds [Normal Affect] : the affect was normal [Alert and Oriented x3] : oriented to person, place, and time [Normal Mood] : the mood was normal [de-identified] : resolving ecchymosis left anterior leg-palpable lump-no sign of infection or active drainage medial side [de-identified] : Dystrophic nails b/l feet

## 2024-06-18 NOTE — HISTORY OF PRESENT ILLNESS
[FreeTextEntry1] : Mr. RAMÍREZSKY presents for annual physical. [de-identified] : Mr. STEVEN presents for annual physical.  Will be seeing Urology upcoming.    Saw Cardiology this spring.   End of May bumped left leg on tree stump in the yard-bruised but had swelling went down some and then still has a bump. No open cut.   History of neuropathy-does not feel any pain.

## 2024-06-20 ENCOUNTER — APPOINTMENT (OUTPATIENT)
Dept: ULTRASOUND IMAGING | Facility: CLINIC | Age: 84
End: 2024-06-20

## 2024-06-20 ENCOUNTER — APPOINTMENT (OUTPATIENT)
Dept: OTOLARYNGOLOGY | Facility: CLINIC | Age: 84
End: 2024-06-20
Payer: MEDICARE

## 2024-06-20 DIAGNOSIS — Z00.00 ENCOUNTER FOR GENERAL ADULT MEDICAL EXAMINATION W/OUT ABNORMAL FINDINGS: ICD-10-CM

## 2024-06-20 DIAGNOSIS — H91.90 UNSPECIFIED HEARING LOSS, UNSPECIFIED EAR: ICD-10-CM

## 2024-06-20 LAB
ALBUMIN SERPL ELPH-MCNC: 4.6 G/DL
ALP BLD-CCNC: 67 U/L
ALT SERPL-CCNC: 15 U/L
ANION GAP SERPL CALC-SCNC: 13 MMOL/L
AST SERPL-CCNC: 24 U/L
BASOPHILS # BLD AUTO: 0.04 K/UL
BASOPHILS NFR BLD AUTO: 0.6 %
BILIRUB SERPL-MCNC: 0.9 MG/DL
BUN SERPL-MCNC: 18 MG/DL
CALCIUM SERPL-MCNC: 10.5 MG/DL
CHLORIDE SERPL-SCNC: 98 MMOL/L
CHOLEST SERPL-MCNC: 155 MG/DL
CO2 SERPL-SCNC: 26 MMOL/L
CREAT SERPL-MCNC: 0.89 MG/DL
EGFR: 86 ML/MIN/1.73M2
EOSINOPHIL # BLD AUTO: 0.26 K/UL
EOSINOPHIL NFR BLD AUTO: 3.8 %
ESTIMATED AVERAGE GLUCOSE: 100 MG/DL
FOLATE SERPL-MCNC: >20 NG/ML
GLUCOSE SERPL-MCNC: 91 MG/DL
HBA1C MFR BLD HPLC: 5.1 %
HCT VFR BLD CALC: 42.3 %
HDLC SERPL-MCNC: 62 MG/DL
HGB BLD-MCNC: 14.3 G/DL
IMM GRANULOCYTES NFR BLD AUTO: 0.4 %
LDLC SERPL CALC-MCNC: 82 MG/DL
LYMPHOCYTES # BLD AUTO: 1.61 K/UL
LYMPHOCYTES NFR BLD AUTO: 23.7 %
MAN DIFF?: NORMAL
MCHC RBC-ENTMCNC: 33 PG
MCHC RBC-ENTMCNC: 33.8 GM/DL
MCV RBC AUTO: 97.7 FL
MONOCYTES # BLD AUTO: 0.7 K/UL
MONOCYTES NFR BLD AUTO: 10.3 %
NEUTROPHILS # BLD AUTO: 4.15 K/UL
NEUTROPHILS NFR BLD AUTO: 61.2 %
NONHDLC SERPL-MCNC: 92 MG/DL
PLATELET # BLD AUTO: NORMAL K/UL
POTASSIUM SERPL-SCNC: 5.2 MMOL/L
PROT SERPL-MCNC: 7.5 G/DL
PSA SERPL-MCNC: 1.55 NG/ML
RBC # BLD: 4.33 M/UL
RBC # FLD: 13.4 %
SODIUM SERPL-SCNC: 137 MMOL/L
TRIGL SERPL-MCNC: 50 MG/DL
TSH SERPL-ACNC: 2.77 UIU/ML
VIT B12 SERPL-MCNC: 536 PG/ML
WBC # FLD AUTO: 6.79 K/UL

## 2024-06-20 PROCEDURE — 92557 COMPREHENSIVE HEARING TEST: CPT

## 2024-06-20 PROCEDURE — 92567 TYMPANOMETRY: CPT

## 2024-06-20 PROCEDURE — 99203 OFFICE O/P NEW LOW 30 MIN: CPT

## 2024-06-20 NOTE — PLAN
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 He would benefit from a hearing aid evaluation.   Referral to audiology.   Fu with me PRN.

## 2024-06-20 NOTE — HISTORY OF PRESENT ILLNESS
[de-identified] : 84M with progressive hearing loss. He has noticed that over last several years, he is not hearing as well. He does not appreciate 1 ear being worse than the other. He has had hearing tests in the past, and was told his hearing was OK. He denies tinnitus, dizziness, otalgia, otorrhea.

## 2024-06-21 ENCOUNTER — NON-APPOINTMENT (OUTPATIENT)
Age: 84
End: 2024-06-21

## 2024-06-21 DIAGNOSIS — R22.42 LOCALIZED SWELLING, MASS AND LUMP, LEFT LOWER LIMB: ICD-10-CM

## 2024-06-25 ENCOUNTER — APPOINTMENT (OUTPATIENT)
Dept: UROLOGY | Facility: CLINIC | Age: 84
End: 2024-06-25
Payer: MEDICARE

## 2024-06-25 DIAGNOSIS — R33.9 RETENTION OF URINE, UNSPECIFIED: ICD-10-CM

## 2024-06-25 LAB — PLATELET # PLAS AUTO: NORMAL

## 2024-06-25 PROCEDURE — 99212 OFFICE O/P EST SF 10 MIN: CPT

## 2024-06-25 PROCEDURE — G2211 COMPLEX E/M VISIT ADD ON: CPT

## 2024-06-26 PROBLEM — R33.9 INCOMPLETE BLADDER EMPTYING: Status: ACTIVE | Noted: 2022-10-06

## 2024-06-28 ENCOUNTER — RX RENEWAL (OUTPATIENT)
Age: 84
End: 2024-06-28

## 2024-07-02 ENCOUNTER — LABORATORY RESULT (OUTPATIENT)
Age: 84
End: 2024-07-02

## 2024-07-12 NOTE — ED PROVIDER NOTE - CRTICAL CARE TIME SPENT (MIN)
Ms. Mathur is a 63 years old patient status post bilateral breast reduction.  She is 5 weeks out from surgery.  She is doing well.    At the physical exam, all incisions are well-healed.  No sign of hypertrophic scarring or keloid.  Excellent shape and symmetry bilaterally.  Bilateral nipple areolar complex are viable.                  Plan: May resume all normal activities, continue with cocoa butter lotion over the scars for a scar massage, continue with sports bra for another 2 weeks and follow-up as needed.  Patient is happy with results and so am I.    Jett Gentile MD , FACS   Diplomate American Board of Plastic Surgery  Diplomate American Board of Surgery  Adj. Assistant Professor of Surgery  Division of Plastic & Reconstructive Surgery   [facility]  Office: [phone]   7/12/2024 at 9:21 AM    45

## 2024-08-12 ENCOUNTER — APPOINTMENT (OUTPATIENT)
Dept: FAMILY MEDICINE | Facility: CLINIC | Age: 84
End: 2024-08-12
Payer: MEDICARE

## 2024-08-12 VITALS
BODY MASS INDEX: 25.33 KG/M2 | RESPIRATION RATE: 16 BRPM | WEIGHT: 187 LBS | TEMPERATURE: 97.5 F | OXYGEN SATURATION: 98 % | SYSTOLIC BLOOD PRESSURE: 132 MMHG | HEART RATE: 50 BPM | DIASTOLIC BLOOD PRESSURE: 76 MMHG | HEIGHT: 72 IN

## 2024-08-12 DIAGNOSIS — I10 ESSENTIAL (PRIMARY) HYPERTENSION: ICD-10-CM

## 2024-08-12 DIAGNOSIS — R79.89 OTHER SPECIFIED ABNORMAL FINDINGS OF BLOOD CHEMISTRY: ICD-10-CM

## 2024-08-12 DIAGNOSIS — E03.9 HYPOTHYROIDISM, UNSPECIFIED: ICD-10-CM

## 2024-08-12 PROCEDURE — 36415 COLL VENOUS BLD VENIPUNCTURE: CPT

## 2024-08-12 PROCEDURE — 99214 OFFICE O/P EST MOD 30 MIN: CPT

## 2024-08-12 PROCEDURE — G2211 COMPLEX E/M VISIT ADD ON: CPT

## 2024-08-12 NOTE — PLAN
[FreeTextEntry1] : Low Platelets-recheck CBC, Platelets-check B12.   Discussed Hematology evaluation if persistently low.  Fatigue-recheck B12 and CMP.  Upcoming Cardiology appointment.  Skin Irritation-discussed trial of topical hydrocortisone if itching-patient wishes to follow up with Dermatology. Advised to wear loose breathable clothing/cotton garments.  Discussed sun protection/hat/sunscreen.

## 2024-08-12 NOTE — HISTORY OF PRESENT ILLNESS
[FreeTextEntry1] : Here for follow-up recheck platelets [de-identified] : Here for follow-up recheck platelets  More itchy ears- Facial redness. Saw Dermatology-had a few lesions treated; going back in September.  No scaling.  Has new irritation on chest-was out in the sun this week. Denies any changes to soaps, lotions, shampoos, personal products, detergents.  Felt tired/Was having trouble on very humid days. BP was fine those days.  Feels like new medications could be contributing.  Has upcoming Cardiology appt.  Went to see ENT-for ears-was advised could need hearing aid.  Did not see Audiologist yet.

## 2024-08-12 NOTE — PHYSICAL EXAM
[No Acute Distress] : no acute distress [No Edema] : there was no peripheral edema [No Extremity Clubbing/Cyanosis] : no extremity clubbing/cyanosis [Normal Oropharynx] : the oropharynx was normal [Normal TMs] : both tympanic membranes were normal [Normal] : affect was normal and insight and judgment were intact [de-identified] : no scaling in ear canals [de-identified] : red macular across chest; no pustules no active drainage;

## 2024-08-12 NOTE — REVIEW OF SYSTEMS
[Itching] : itching [Skin Rash] : skin rash [Negative] : Gastrointestinal [Fever] : no fever [Chills] : no chills [Chest Pain] : no chest pain [Palpitations] : no palpitations [Wheezing] : no wheezing [Cough] : no cough [Headache] : no headache [Dizziness] : no dizziness [FreeTextEntry4] : ears itchy [de-identified] : 5 hours of sleep

## 2024-08-12 NOTE — REVIEW OF SYSTEMS
[Itching] : itching [Skin Rash] : skin rash [Negative] : Gastrointestinal [Fever] : no fever [Chills] : no chills [Chest Pain] : no chest pain [Palpitations] : no palpitations [Wheezing] : no wheezing [Cough] : no cough [Headache] : no headache [Dizziness] : no dizziness [FreeTextEntry4] : ears itchy [de-identified] : 5 hours of sleep

## 2024-08-12 NOTE — HISTORY OF PRESENT ILLNESS
[FreeTextEntry1] : Here for follow-up recheck platelets [de-identified] : Here for follow-up recheck platelets  More itchy ears- Facial redness. Saw Dermatology-had a few lesions treated; going back in September.  No scaling.  Has new irritation on chest-was out in the sun this week. Denies any changes to soaps, lotions, shampoos, personal products, detergents.  Felt tired/Was having trouble on very humid days. BP was fine those days.  Feels like new medications could be contributing.  Has upcoming Cardiology appt.  Went to see ENT-for ears-was advised could need hearing aid.  Did not see Audiologist yet.

## 2024-08-12 NOTE — PHYSICAL EXAM
[No Acute Distress] : no acute distress [No Edema] : there was no peripheral edema [No Extremity Clubbing/Cyanosis] : no extremity clubbing/cyanosis [Normal Oropharynx] : the oropharynx was normal [Normal TMs] : both tympanic membranes were normal [Normal] : affect was normal and insight and judgment were intact [de-identified] : no scaling in ear canals [de-identified] : red macular across chest; no pustules no active drainage;

## 2024-08-14 LAB
ALBUMIN SERPL ELPH-MCNC: 4.7 G/DL
ALP BLD-CCNC: 64 U/L
ALT SERPL-CCNC: 15 U/L
ANION GAP SERPL CALC-SCNC: 17 MMOL/L
AST SERPL-CCNC: 24 U/L
BASOPHILS # BLD AUTO: 0.03 K/UL
BASOPHILS NFR BLD AUTO: 0.6 %
BILIRUB SERPL-MCNC: 0.8 MG/DL
BUN SERPL-MCNC: 18 MG/DL
CALCIUM SERPL-MCNC: 10.3 MG/DL
CHLORIDE SERPL-SCNC: 97 MMOL/L
CO2 SERPL-SCNC: 22 MMOL/L
CREAT SERPL-MCNC: 0.95 MG/DL
EGFR: 80 ML/MIN/1.73M2
EOSINOPHIL # BLD AUTO: 0.25 K/UL
EOSINOPHIL NFR BLD AUTO: 4.9 %
FOLATE SERPL-MCNC: >20 NG/ML
GLUCOSE SERPL-MCNC: 89 MG/DL
HCT VFR BLD CALC: 43.5 %
HGB BLD-MCNC: 14.2 G/DL
IMM GRANULOCYTES NFR BLD AUTO: 0.2 %
LYMPHOCYTES # BLD AUTO: 1.25 K/UL
LYMPHOCYTES NFR BLD AUTO: 24.7 %
MAN DIFF?: NORMAL
MCHC RBC-ENTMCNC: 31.8 PG
MCHC RBC-ENTMCNC: 32.6 GM/DL
MCV RBC AUTO: 97.3 FL
MONOCYTES # BLD AUTO: 0.58 K/UL
MONOCYTES NFR BLD AUTO: 11.5 %
NEUTROPHILS # BLD AUTO: 2.94 K/UL
NEUTROPHILS NFR BLD AUTO: 58.1 %
PLATELET # BLD AUTO: 191 K/UL
PLATELET # PLAS AUTO: NORMAL
POTASSIUM SERPL-SCNC: 4.8 MMOL/L
PROT SERPL-MCNC: 7.6 G/DL
RBC # BLD: 4.47 M/UL
RBC # FLD: 13.2 %
SODIUM SERPL-SCNC: 136 MMOL/L
TSH SERPL-ACNC: 2.62 UIU/ML
VIT B12 SERPL-MCNC: 585 PG/ML
WBC # FLD AUTO: 5.06 K/UL

## 2024-09-14 NOTE — CARDIOLOGY SUMMARY
[de-identified] : ECG from 9/20/2024: ECG from 2/9/2024: Sinus rhythm at 68bpm with first-degree AV delay (NC: 210ms) ECG from 2/2/2024: AF at 135bpm [de-identified] : TTE from 11/17/2023: EF: 66%, normal LV diastolic function, normal RV size and systolic function, normal LA and RA, trace MR, trace TR, normal pericardial effusion [de-identified] : CT Heart LA from 2/2/2024: LA is enlarged, no ANNELIESE thrombus, coronary calcium score 878 [de-identified] : DCCV from 2/2/2024: Single 200j DCCV for AF

## 2024-09-14 NOTE — HISTORY OF PRESENT ILLNESS
[FreeTextEntry1] : Mr. Christofer Gutierrez is an 84-year-old man with hypertension, hyperlipidemia, benign prostatic hyperplasia (status post a transurethral prostate resection in October of 2022, still self-catheterizing) and atrial fibrillation. He presents today for follow up after undergoing a direct current cardioversion for symptomatic atrial fibrillation on February 2nd. 2024.  To briefly summarize his history, he presented to [facility] on February 2nd, 2024 with four days of symptoms. His symptoms included dizziness, pre-syncope, palpitations and fatigue. These symptoms started after he drank four drinks on the night prior. He drinks on average 1-2 drinks per night. After excluding a left atrial appendage thrombus using a CT of the left atrium, the patient underwent a direct current cardioversion on February 2nd. 2024. When he followed up with me in the office on February 9th, 2024 he was maintaining sinus rhythm.  Since his last visit with me he has been feeling ____  CHADS2-VASC: 3 (Age: 4, HTN: 1)

## 2024-09-14 NOTE — CARDIOLOGY SUMMARY
[de-identified] : ECG from 9/20/2024: ECG from 2/9/2024: Sinus rhythm at 68bpm with first-degree AV delay (MO: 210ms) ECG from 2/2/2024: AF at 135bpm [de-identified] : TTE from 11/17/2023: EF: 66%, normal LV diastolic function, normal RV size and systolic function, normal LA and RA, trace MR, trace TR, normal pericardial effusion [de-identified] : CT Heart LA from 2/2/2024: LA is enlarged, no ANNELIESE thrombus, coronary calcium score 878 [de-identified] : DCCV from 2/2/2024: Single 200j DCCV for AF

## 2024-09-14 NOTE — CARDIOLOGY SUMMARY
[de-identified] : ECG from 9/20/2024: ECG from 2/9/2024: Sinus rhythm at 68bpm with first-degree AV delay (NE: 210ms) ECG from 2/2/2024: AF at 135bpm [de-identified] : TTE from 11/17/2023: EF: 66%, normal LV diastolic function, normal RV size and systolic function, normal LA and RA, trace MR, trace TR, normal pericardial effusion [de-identified] : CT Heart LA from 2/2/2024: LA is enlarged, no ANNELIESE thrombus, coronary calcium score 878 [de-identified] : DCCV from 2/2/2024: Single 200j DCCV for AF

## 2024-09-14 NOTE — HISTORY OF PRESENT ILLNESS
[FreeTextEntry1] : Mr. Christofer Gutierrez is an 84-year-old man with hypertension, hyperlipidemia, benign prostatic hyperplasia (status post a transurethral prostate resection in October of 2022, still self-catheterizing) and atrial fibrillation. He presents today for follow up after undergoing a direct current cardioversion for symptomatic atrial fibrillation on February 2nd. 2024.  To briefly summarize his history, he presented to [facility] on February 2nd, 2024 with four days of symptoms. His symptoms included dizziness, pre-syncope, palpitations and fatigue. These symptoms started after he drank four drinks on the night prior. He drinks on average 1-2 drinks per night. After excluding a left atrial appendage thrombus using a CT of the left atrium, the patient underwent a direct current cardioversion on February 2nd. 2024. When he followed up with me in the office on February 9th, 2024 he was maintaining sinus rhythm.  Since his last visit with me he has been feeling ____  CHADS2-VASC: 3 (Age: 4, HTN: 1)

## 2024-09-14 NOTE — HISTORY OF PRESENT ILLNESS
[FreeTextEntry1] : Mr. Christofer Gutierrez is an 84-year-old man with hypertension, hyperlipidemia, benign prostatic hyperplasia (status post a transurethral prostate resection in October of 2022, still self-catheterizing) and atrial fibrillation. He presents today for follow up after undergoing a direct current cardioversion for symptomatic atrial fibrillation on February 2nd. 2024.  To briefly summarize his history, he presented to [facility] on February 2nd, 2024 with four days of symptoms. His symptoms included dizziness, pre-syncope, palpitations and fatigue. These symptoms started after he drank four drinks on the night prior. He drinks on average 1-2 drinks per night. After excluding a left atrial appendage thrombus using a CT of the left atrium, the patient underwent a direct current cardioversion on February 2nd. 2024. When he followed up with me in the office on February 9th, 2024 he was maintaining sinus rhythm.  Since his last visit with me he has been feeling ____  CHADS2-VASC: 3 (Age: 4, HTN: 1)

## 2024-09-20 ENCOUNTER — APPOINTMENT (OUTPATIENT)
Dept: ELECTROPHYSIOLOGY | Facility: CLINIC | Age: 84
End: 2024-09-20
Payer: MEDICARE

## 2024-09-20 ENCOUNTER — NON-APPOINTMENT (OUTPATIENT)
Age: 84
End: 2024-09-20

## 2024-09-20 VITALS
WEIGHT: 188 LBS | DIASTOLIC BLOOD PRESSURE: 80 MMHG | SYSTOLIC BLOOD PRESSURE: 147 MMHG | HEART RATE: 48 BPM | BODY MASS INDEX: 25.47 KG/M2 | HEIGHT: 72 IN | OXYGEN SATURATION: 97 % | RESPIRATION RATE: 14 BRPM

## 2024-09-20 DIAGNOSIS — R00.1 BRADYCARDIA, UNSPECIFIED: ICD-10-CM

## 2024-09-20 DIAGNOSIS — I48.0 PAROXYSMAL ATRIAL FIBRILLATION: ICD-10-CM

## 2024-09-20 PROCEDURE — 99215 OFFICE O/P EST HI 40 MIN: CPT

## 2024-09-20 PROCEDURE — 99205 OFFICE O/P NEW HI 60 MIN: CPT

## 2024-09-20 PROCEDURE — 93000 ELECTROCARDIOGRAM COMPLETE: CPT

## 2024-09-20 RX ORDER — B-COMPLEX WITH VITAMIN C
TABLET ORAL DAILY
Refills: 0 | Status: ACTIVE | COMMUNITY

## 2024-09-20 RX ORDER — ASCORBIC ACID 500 MG
TABLET ORAL DAILY
Refills: 0 | Status: ACTIVE | COMMUNITY

## 2024-10-16 ENCOUNTER — APPOINTMENT (OUTPATIENT)
Dept: ELECTROPHYSIOLOGY | Facility: CLINIC | Age: 84
End: 2024-10-16

## 2024-10-17 ENCOUNTER — APPOINTMENT (OUTPATIENT)
Dept: FAMILY MEDICINE | Facility: CLINIC | Age: 84
End: 2024-10-17
Payer: MEDICARE

## 2024-10-17 VITALS
SYSTOLIC BLOOD PRESSURE: 120 MMHG | TEMPERATURE: 97.7 F | DIASTOLIC BLOOD PRESSURE: 80 MMHG | WEIGHT: 188 LBS | HEIGHT: 72 IN | HEART RATE: 87 BPM | RESPIRATION RATE: 14 BRPM | OXYGEN SATURATION: 96 % | BODY MASS INDEX: 25.47 KG/M2

## 2024-10-17 DIAGNOSIS — R30.0 DYSURIA: ICD-10-CM

## 2024-10-17 DIAGNOSIS — N39.0 URINARY TRACT INFECTION, SITE NOT SPECIFIED: ICD-10-CM

## 2024-10-17 PROCEDURE — 99213 OFFICE O/P EST LOW 20 MIN: CPT

## 2024-10-17 PROCEDURE — 81003 URINALYSIS AUTO W/O SCOPE: CPT | Mod: QW

## 2024-10-17 RX ORDER — CIPROFLOXACIN HYDROCHLORIDE 500 MG/1
500 TABLET, FILM COATED ORAL TWICE DAILY
Qty: 14 | Refills: 0 | Status: ACTIVE | COMMUNITY
Start: 2024-10-17 | End: 1900-01-01

## 2024-10-19 LAB
BILIRUB UR QL STRIP: NEGATIVE
CLARITY UR: NORMAL
COLLECTION METHOD: NORMAL
GLUCOSE UR-MCNC: NEGATIVE
HCG UR QL: 0.2 EU/DL
HGB UR QL STRIP.AUTO: NORMAL
KETONES UR-MCNC: NEGATIVE
LEUKOCYTE ESTERASE UR QL STRIP: NORMAL
NITRITE UR QL STRIP: POSITIVE
PH UR STRIP: 6
PROT UR STRIP-MCNC: NORMAL
SP GR UR STRIP: 1.02

## 2024-10-21 LAB — BACTERIA UR CULT: ABNORMAL

## 2024-11-09 PROCEDURE — 93248 EXT ECG>7D<15D REV&INTERPJ: CPT

## 2024-11-18 ENCOUNTER — RX RENEWAL (OUTPATIENT)
Age: 84
End: 2024-11-18

## 2024-11-25 ENCOUNTER — NON-APPOINTMENT (OUTPATIENT)
Age: 84
End: 2024-11-25

## 2024-12-23 ENCOUNTER — RX RENEWAL (OUTPATIENT)
Age: 84
End: 2024-12-23

## 2024-12-26 ENCOUNTER — RX RENEWAL (OUTPATIENT)
Age: 84
End: 2024-12-26

## 2025-01-29 ENCOUNTER — RX RENEWAL (OUTPATIENT)
Age: 85
End: 2025-01-29

## 2025-02-06 ENCOUNTER — RX RENEWAL (OUTPATIENT)
Age: 85
End: 2025-02-06

## 2025-02-10 ENCOUNTER — RX RENEWAL (OUTPATIENT)
Age: 85
End: 2025-02-10

## 2025-02-27 ENCOUNTER — APPOINTMENT (OUTPATIENT)
Dept: CARDIOLOGY | Facility: CLINIC | Age: 85
End: 2025-02-27
Payer: MEDICARE

## 2025-02-27 ENCOUNTER — NON-APPOINTMENT (OUTPATIENT)
Age: 85
End: 2025-02-27

## 2025-02-27 VITALS — DIASTOLIC BLOOD PRESSURE: 80 MMHG | SYSTOLIC BLOOD PRESSURE: 132 MMHG

## 2025-02-27 VITALS
DIASTOLIC BLOOD PRESSURE: 78 MMHG | BODY MASS INDEX: 25.87 KG/M2 | HEART RATE: 72 BPM | OXYGEN SATURATION: 97 % | SYSTOLIC BLOOD PRESSURE: 142 MMHG | HEIGHT: 72 IN | WEIGHT: 191 LBS

## 2025-02-27 DIAGNOSIS — I48.0 PAROXYSMAL ATRIAL FIBRILLATION: ICD-10-CM

## 2025-02-27 DIAGNOSIS — I10 ESSENTIAL (PRIMARY) HYPERTENSION: ICD-10-CM

## 2025-02-27 DIAGNOSIS — E78.5 HYPERLIPIDEMIA, UNSPECIFIED: ICD-10-CM

## 2025-02-27 DIAGNOSIS — R07.89 OTHER CHEST PAIN: ICD-10-CM

## 2025-02-27 PROCEDURE — 99214 OFFICE O/P EST MOD 30 MIN: CPT

## 2025-02-27 PROCEDURE — 93000 ELECTROCARDIOGRAM COMPLETE: CPT

## 2025-02-27 PROCEDURE — G2211 COMPLEX E/M VISIT ADD ON: CPT

## 2025-03-10 ENCOUNTER — RX RENEWAL (OUTPATIENT)
Age: 85
End: 2025-03-10

## 2025-03-20 ENCOUNTER — RX RENEWAL (OUTPATIENT)
Age: 85
End: 2025-03-20

## 2025-03-28 ENCOUNTER — APPOINTMENT (OUTPATIENT)
Dept: FAMILY MEDICINE | Facility: CLINIC | Age: 85
End: 2025-03-28
Payer: MEDICARE

## 2025-03-28 VITALS
RESPIRATION RATE: 15 BRPM | TEMPERATURE: 97.7 F | OXYGEN SATURATION: 97 % | HEIGHT: 72 IN | HEART RATE: 61 BPM | BODY MASS INDEX: 25.6 KG/M2 | DIASTOLIC BLOOD PRESSURE: 82 MMHG | SYSTOLIC BLOOD PRESSURE: 136 MMHG | WEIGHT: 189 LBS

## 2025-03-28 DIAGNOSIS — R39.9 UNSPECIFIED SYMPTOMS AND SIGNS INVOLVING THE GENITOURINARY SYSTEM: ICD-10-CM

## 2025-03-28 DIAGNOSIS — R35.0 FREQUENCY OF MICTURITION: ICD-10-CM

## 2025-03-28 PROCEDURE — G2211 COMPLEX E/M VISIT ADD ON: CPT

## 2025-03-28 PROCEDURE — 99214 OFFICE O/P EST MOD 30 MIN: CPT

## 2025-03-28 RX ORDER — HYDROXYCHLOROQUINE SULFATE 200 MG/1
200 TABLET, FILM COATED ORAL
Refills: 0 | Status: ACTIVE | COMMUNITY

## 2025-03-31 ENCOUNTER — TRANSCRIPTION ENCOUNTER (OUTPATIENT)
Age: 85
End: 2025-03-31

## 2025-03-31 DIAGNOSIS — N39.0 URINARY TRACT INFECTION, SITE NOT SPECIFIED: ICD-10-CM

## 2025-03-31 LAB
APPEARANCE: ABNORMAL
BACTERIA UR CULT: ABNORMAL
BACTERIA: ABNORMAL /HPF
BILIRUBIN URINE: NEGATIVE
BLOOD URINE: ABNORMAL
CAST: 1 /LPF
COLOR: YELLOW
EPITHELIAL CELLS: 0 /HPF
GLUCOSE QUALITATIVE U: NEGATIVE MG/DL
KETONES URINE: NEGATIVE MG/DL
LEUKOCYTE ESTERASE URINE: ABNORMAL
MICROSCOPIC-UA: NORMAL
NITRITE URINE: POSITIVE
PH URINE: 7
PROTEIN URINE: NORMAL MG/DL
RED BLOOD CELLS URINE: 203 /HPF
REVIEW: NORMAL
SPECIFIC GRAVITY URINE: 1.01
UROBILINOGEN URINE: 0.2 MG/DL
WHITE BLOOD CELLS URINE: 104 /HPF

## 2025-03-31 RX ORDER — CEFUROXIME AXETIL 500 MG/1
500 TABLET, FILM COATED ORAL
Qty: 14 | Refills: 0 | Status: ACTIVE | COMMUNITY
Start: 2025-03-31 | End: 1900-01-01

## 2025-04-07 ENCOUNTER — RX RENEWAL (OUTPATIENT)
Age: 85
End: 2025-04-07

## 2025-05-06 ENCOUNTER — RX RENEWAL (OUTPATIENT)
Age: 85
End: 2025-05-06

## 2025-06-03 ENCOUNTER — APPOINTMENT (OUTPATIENT)
Dept: UROLOGY | Facility: CLINIC | Age: 85
End: 2025-06-03

## 2025-06-05 ENCOUNTER — RX RENEWAL (OUTPATIENT)
Age: 85
End: 2025-06-05

## 2025-06-11 ENCOUNTER — RX RENEWAL (OUTPATIENT)
Age: 85
End: 2025-06-11

## 2025-06-20 ENCOUNTER — APPOINTMENT (OUTPATIENT)
Dept: FAMILY MEDICINE | Facility: CLINIC | Age: 85
End: 2025-06-20
Payer: MEDICARE

## 2025-06-20 VITALS
HEIGHT: 72 IN | RESPIRATION RATE: 16 BRPM | SYSTOLIC BLOOD PRESSURE: 129 MMHG | HEART RATE: 79 BPM | TEMPERATURE: 97.6 F | OXYGEN SATURATION: 97 % | BODY MASS INDEX: 25.47 KG/M2 | WEIGHT: 188 LBS | DIASTOLIC BLOOD PRESSURE: 79 MMHG

## 2025-06-20 PROCEDURE — G0439: CPT

## 2025-06-20 PROCEDURE — 36415 COLL VENOUS BLD VENIPUNCTURE: CPT

## 2025-06-21 LAB
ALBUMIN SERPL ELPH-MCNC: 4.5 G/DL
ALP BLD-CCNC: 59 U/L
ALT SERPL-CCNC: 25 U/L
ANION GAP SERPL CALC-SCNC: 16 MMOL/L
AST SERPL-CCNC: 30 U/L
BASOPHILS # BLD AUTO: 0.05 K/UL
BASOPHILS NFR BLD AUTO: 0.7 %
BILIRUB SERPL-MCNC: 0.7 MG/DL
BUN SERPL-MCNC: 19 MG/DL
CALCIUM SERPL-MCNC: 10 MG/DL
CHLORIDE SERPL-SCNC: 100 MMOL/L
CHOLEST SERPL-MCNC: 144 MG/DL
CO2 SERPL-SCNC: 25 MMOL/L
CREAT SERPL-MCNC: 0.9 MG/DL
EGFRCR SERPLBLD CKD-EPI 2021: 85 ML/MIN/1.73M2
EOSINOPHIL # BLD AUTO: 0.32 K/UL
EOSINOPHIL NFR BLD AUTO: 4.2 %
ESTIMATED AVERAGE GLUCOSE: 103 MG/DL
FOLATE SERPL-MCNC: >20 NG/ML
GLUCOSE SERPL-MCNC: 65 MG/DL
HBA1C MFR BLD HPLC: 5.2 %
HCT VFR BLD CALC: 43 %
HDLC SERPL-MCNC: 71 MG/DL
HGB BLD-MCNC: 14.4 G/DL
IMM GRANULOCYTES NFR BLD AUTO: 0.1 %
LDLC SERPL-MCNC: 62 MG/DL
LYMPHOCYTES # BLD AUTO: 1.41 K/UL
LYMPHOCYTES NFR BLD AUTO: 18.5 %
MAN DIFF?: NORMAL
MCHC RBC-ENTMCNC: 33 PG
MCHC RBC-ENTMCNC: 33.5 G/DL
MCV RBC AUTO: 98.4 FL
MONOCYTES # BLD AUTO: 0.85 K/UL
MONOCYTES NFR BLD AUTO: 11.2 %
NEUTROPHILS # BLD AUTO: 4.97 K/UL
NEUTROPHILS NFR BLD AUTO: 65.3 %
NONHDLC SERPL-MCNC: 73 MG/DL
PLATELET # BLD AUTO: 208 K/UL
POTASSIUM SERPL-SCNC: 5.2 MMOL/L
PROT SERPL-MCNC: 7 G/DL
PSA SERPL-MCNC: 1.76 NG/ML
RBC # BLD: 4.37 M/UL
RBC # FLD: 12.8 %
SODIUM SERPL-SCNC: 141 MMOL/L
TRIGL SERPL-MCNC: 50 MG/DL
TSH SERPL-ACNC: 2.06 UIU/ML
VIT B12 SERPL-MCNC: 625 PG/ML
WBC # FLD AUTO: 7.61 K/UL

## 2025-07-01 ENCOUNTER — APPOINTMENT (OUTPATIENT)
Dept: OTOLARYNGOLOGY | Facility: CLINIC | Age: 85
End: 2025-07-01
Payer: MEDICARE

## 2025-07-01 VITALS
WEIGHT: 188 LBS | BODY MASS INDEX: 25.47 KG/M2 | HEART RATE: 58 BPM | OXYGEN SATURATION: 96 % | SYSTOLIC BLOOD PRESSURE: 125 MMHG | HEIGHT: 72 IN | DIASTOLIC BLOOD PRESSURE: 77 MMHG

## 2025-07-01 PROBLEM — H90.3 SENSORINEURAL HEARING LOSS (SNHL) OF BOTH EARS: Status: ACTIVE | Noted: 2025-07-01

## 2025-07-01 PROBLEM — J38.7 PRESBYLARYNX: Status: ACTIVE | Noted: 2025-07-01

## 2025-07-01 PROBLEM — H90.3 SNHL (SENSORY-NEURAL HEARING LOSS), ASYMMETRICAL: Status: ACTIVE | Noted: 2025-07-01

## 2025-07-01 PROBLEM — R09.82 PND (POST-NASAL DRIP): Status: ACTIVE | Noted: 2025-07-01

## 2025-07-01 PROBLEM — R49.0 DYSPHONIA: Status: ACTIVE | Noted: 2025-07-01

## 2025-07-01 PROCEDURE — 99214 OFFICE O/P EST MOD 30 MIN: CPT | Mod: 25

## 2025-07-01 PROCEDURE — 31231 NASAL ENDOSCOPY DX: CPT

## 2025-07-01 PROCEDURE — 92567 TYMPANOMETRY: CPT

## 2025-07-01 PROCEDURE — 92557 COMPREHENSIVE HEARING TEST: CPT

## 2025-07-01 RX ORDER — SOD CHLOR,BICARB/SQUEEZ BOTTLE
PACKET, WITH RINSE DEVICE NASAL
Qty: 100 | Refills: 1 | Status: ACTIVE | COMMUNITY
Start: 2025-07-01 | End: 1900-01-01

## 2025-07-01 RX ORDER — FLUTICASONE PROPIONATE 50 UG/1
50 SPRAY NASAL
Qty: 16 | Refills: 1 | Status: ACTIVE | COMMUNITY
Start: 2025-07-01 | End: 1900-01-01

## 2025-07-07 ENCOUNTER — OUTPATIENT (OUTPATIENT)
Dept: OUTPATIENT SERVICES | Facility: HOSPITAL | Age: 85
LOS: 1 days | End: 2025-07-07
Payer: MEDICARE

## 2025-07-07 ENCOUNTER — APPOINTMENT (OUTPATIENT)
Dept: MRI IMAGING | Facility: CLINIC | Age: 85
End: 2025-07-07
Payer: MEDICARE

## 2025-07-07 DIAGNOSIS — Z90.49 ACQUIRED ABSENCE OF OTHER SPECIFIED PARTS OF DIGESTIVE TRACT: Chronic | ICD-10-CM

## 2025-07-07 DIAGNOSIS — H90.3 SENSORINEURAL HEARING LOSS, BILATERAL: ICD-10-CM

## 2025-07-07 DIAGNOSIS — Z98.890 OTHER SPECIFIED POSTPROCEDURAL STATES: Chronic | ICD-10-CM

## 2025-07-07 PROCEDURE — 70553 MRI BRAIN STEM W/O & W/DYE: CPT | Mod: 26

## 2025-07-07 PROCEDURE — 70553 MRI BRAIN STEM W/O & W/DYE: CPT

## 2025-07-07 PROCEDURE — A9585: CPT

## 2025-08-04 ENCOUNTER — RX RENEWAL (OUTPATIENT)
Age: 85
End: 2025-08-04

## 2025-09-04 ENCOUNTER — APPOINTMENT (OUTPATIENT)
Dept: CARDIOLOGY | Facility: CLINIC | Age: 85
End: 2025-09-04

## 2025-09-15 ENCOUNTER — RX RENEWAL (OUTPATIENT)
Age: 85
End: 2025-09-15

## 2025-09-18 ENCOUNTER — APPOINTMENT (OUTPATIENT)
Dept: FAMILY MEDICINE | Facility: CLINIC | Age: 85
End: 2025-09-18
Payer: MEDICARE

## 2025-09-18 VITALS
TEMPERATURE: 97.3 F | DIASTOLIC BLOOD PRESSURE: 77 MMHG | SYSTOLIC BLOOD PRESSURE: 132 MMHG | WEIGHT: 190 LBS | HEART RATE: 72 BPM | BODY MASS INDEX: 25.73 KG/M2 | HEIGHT: 72 IN | OXYGEN SATURATION: 96 % | RESPIRATION RATE: 16 BRPM

## 2025-09-18 DIAGNOSIS — J02.9 ACUTE PHARYNGITIS, UNSPECIFIED: ICD-10-CM

## 2025-09-18 DIAGNOSIS — Z01.818 ENCOUNTER FOR OTHER PREPROCEDURAL EXAMINATION: ICD-10-CM

## 2025-09-18 PROCEDURE — 99214 OFFICE O/P EST MOD 30 MIN: CPT

## 2025-09-19 LAB
INFLUENZA A RESULT: NOT DETECTED
INFLUENZA B RESULT: NOT DETECTED
RESP SYN VIRUS RESULT: NOT DETECTED
SARS-COV-2 RESULT: NOT DETECTED
SOURCE CONJUNCTIVA: NORMAL

## 2025-09-20 ENCOUNTER — TRANSCRIPTION ENCOUNTER (OUTPATIENT)
Age: 85
End: 2025-09-20

## 2025-09-20 LAB — S PYO DNA THROAT QL NAA+PROBE: NOT DETECTED

## (undated) DEVICE — PACK CYSTO

## (undated) DEVICE — ELCTR PLASMA BUTTON OVAL 24FR 12-30 DEG

## (undated) DEVICE — SYR LUER LOK 30CC

## (undated) DEVICE — POSITIONER FOAM EGG CRATE ULNAR 2PCS (PINK)

## (undated) DEVICE — BAG URINE W METER 2L

## (undated) DEVICE — TUBING RANGER FLUID IRRIGATION SET DISP

## (undated) DEVICE — IRRIGATION TRAY W PISTON SYRINGE 60ML

## (undated) DEVICE — SOL IRR BAG NS 0.9% 3000ML

## (undated) DEVICE — ELCTR BUGBEE FULGATING 5FR X 58CM

## (undated) DEVICE — VENODYNE/SCD SLEEVE CALF LARGE

## (undated) DEVICE — GLV 7.5 PROTEXIS (WHITE)

## (undated) DEVICE — ELCTR PLASMA LOOP MEDIUM 24FR 12-16 DEG

## (undated) DEVICE — ELCTR PLASMA LOOP MEDIUM ANGLED 24FR 12-30 DEG

## (undated) DEVICE — WARMING BLANKET UPPER ADULT

## (undated) DEVICE — GOWN TRIMAX LG

## (undated) DEVICE — SOL IRR BAG H2O 3000ML

## (undated) DEVICE — FOLEY HOLDER STATLOCK 2 WAY ADULT